# Patient Record
Sex: FEMALE | Race: OTHER | HISPANIC OR LATINO | Employment: FULL TIME | URBAN - METROPOLITAN AREA
[De-identification: names, ages, dates, MRNs, and addresses within clinical notes are randomized per-mention and may not be internally consistent; named-entity substitution may affect disease eponyms.]

---

## 2021-08-04 ENCOUNTER — OFFICE VISIT (OUTPATIENT)
Dept: GASTROENTEROLOGY | Facility: AMBULARY SURGERY CENTER | Age: 56
End: 2021-08-04
Payer: COMMERCIAL

## 2021-08-04 ENCOUNTER — APPOINTMENT (OUTPATIENT)
Dept: LAB | Facility: HOSPITAL | Age: 56
End: 2021-08-04
Attending: INTERNAL MEDICINE
Payer: COMMERCIAL

## 2021-08-04 ENCOUNTER — HOSPITAL ENCOUNTER (OUTPATIENT)
Dept: CT IMAGING | Facility: HOSPITAL | Age: 56
Discharge: HOME | End: 2021-08-04
Attending: INTERNAL MEDICINE
Payer: COMMERCIAL

## 2021-08-04 VITALS
WEIGHT: 181 LBS | DIASTOLIC BLOOD PRESSURE: 80 MMHG | HEIGHT: 63 IN | BODY MASS INDEX: 32.07 KG/M2 | SYSTOLIC BLOOD PRESSURE: 132 MMHG

## 2021-08-04 DIAGNOSIS — R10.12 LEFT UPPER QUADRANT ABDOMINAL PAIN: ICD-10-CM

## 2021-08-04 DIAGNOSIS — K59.09 OTHER CONSTIPATION: ICD-10-CM

## 2021-08-04 DIAGNOSIS — R10.12 LEFT UPPER QUADRANT ABDOMINAL PAIN: Primary | ICD-10-CM

## 2021-08-04 LAB
ANION GAP SERPL CALCULATED.3IONS-SCNC: 11 MMOL/L (ref 5–14)
BUN SERPL-MCNC: 9 MG/DL (ref 5–25)
CALCIUM SERPL-MCNC: 10.1 MG/DL (ref 8.4–10.2)
CHLORIDE SERPL-SCNC: 101 MMOL/L (ref 97–108)
CO2 SERPL-SCNC: 28 MMOL/L (ref 22–30)
CREAT SERPL-MCNC: 0.64 MG/DL (ref 0.6–1.2)
GFR SERPL CREATININE-BSD FRML MDRD: 101 ML/MIN/1.73SQ M
GLUCOSE P FAST SERPL-MCNC: 165 MG/DL (ref 70–99)
POTASSIUM SERPL-SCNC: 4.3 MMOL/L (ref 3.6–5)
SODIUM SERPL-SCNC: 140 MMOL/L (ref 137–147)

## 2021-08-04 PROCEDURE — 99204 OFFICE O/P NEW MOD 45 MIN: CPT | Performed by: INTERNAL MEDICINE

## 2021-08-04 PROCEDURE — 80048 BASIC METABOLIC PNL TOTAL CA: CPT

## 2021-08-04 PROCEDURE — G1004 CDSM NDSC: HCPCS

## 2021-08-04 PROCEDURE — 36415 COLL VENOUS BLD VENIPUNCTURE: CPT

## 2021-08-04 PROCEDURE — 74177 CT ABD & PELVIS W/CONTRAST: CPT

## 2021-08-04 RX ORDER — PANTOPRAZOLE SODIUM 40 MG/1
40 TABLET, DELAYED RELEASE ORAL DAILY
Qty: 30 TABLET | Refills: 2 | Status: SHIPPED | OUTPATIENT
Start: 2021-08-04 | End: 2022-03-09 | Stop reason: ALTCHOICE

## 2021-08-04 RX ADMIN — IOHEXOL 100 ML: 350 INJECTION, SOLUTION INTRAVENOUS at 19:42

## 2021-08-04 NOTE — ASSESSMENT & PLAN NOTE
Patient with acute onset of abdominal pain since last week and was noted to have some tenderness on the left lower ribcage also during physical exam   Rule out musculoskeletal pain  Rule out peptic ulcer disease or gastric erosions  Rule out any internal hernia  Also possible symptoms are due to constipation  Doubt for diverticulitis  -  Explained to patient in detail about different possible etiologies and given reassurance  -  Schedule for status CT scan of the abdomen and pelvis    - put her on Protonix regularly  Discussed about Carafate but patient reports having constipation before  - Patient was explained about the lifestyle and dietary modifications  Advised to avoid fatty foods, chocolates, caffeine, alcohol and any other triggering foods  Avoid eating for at least 3 hours before going to bed

## 2021-08-04 NOTE — PROGRESS NOTES
Consultation - [facility]  Rosie Harry 1965 female         Chief Complaint:   Abdominal pain    HPI:   60-year-old female with no significant past medical history reports having left upper quadrant pain about 5 days  Complaining about epigastric pain with radiation to the left upper quadrant pain which has been shown and pretty much constant  She had vomiting in the 1st a but having symptoms of nausea since then  Denies any more vomiting  Denies any NSAID use  She also reports having problems with constipation in the stools are normally like ribbon shaped or pellets  She tried using Dulcolax without any significant help  She was admitted to [facility] back in April with similar symptoms and had upper endoscopy at that time which showed erosive duodenitis  She to Protonix for about a month  Good appetite, no recent weight loss  She had colonoscopy about 10 years ago  She gives history of chronic constipation for which she was on Linzess in the past but could not tolerate because of diarrhea  REVIEW OF SYSTEMS: Review of Systems   Constitutional: Negative for activity change, appetite change, chills, diaphoresis, fatigue, fever and unexpected weight change  HENT: Negative for ear discharge, ear pain, facial swelling, hearing loss, nosebleeds, sore throat, tinnitus and voice change  Eyes: Negative for pain, discharge, redness, itching and visual disturbance  Respiratory: Negative for apnea, cough, chest tightness, shortness of breath and wheezing  Cardiovascular: Negative for chest pain and palpitations  Gastrointestinal:        As noted in HPI   Endocrine: Negative for cold intolerance, heat intolerance and polyuria  Genitourinary: Negative for difficulty urinating, dysuria, flank pain, hematuria and urgency  Musculoskeletal: Negative for arthralgias, back pain, gait problem, joint swelling and myalgias  Skin: Negative for rash and wound  Neurological: Negative for dizziness, tremors, seizures, speech difficulty, light-headedness, numbness and headaches  Hematological: Negative for adenopathy  Does not bruise/bleed easily  Psychiatric/Behavioral: Negative for agitation, behavioral problems and confusion  The patient is not nervous/anxious  Past Medical History:   Diagnosis Date    Peptic ulcer     RA (rheumatoid arthritis) ([facility] [address] )       Past Surgical History:   Procedure Laterality Date    APPENDECTOMY      COLONOSCOPY      MOLE EXCISION      Back of head     TUBAL LIGATION      UPPER GASTROINTESTINAL ENDOSCOPY       Social History     Socioeconomic History    Marital status:      Spouse name: Not on file    Number of children: Not on file    Years of education: Not on file    Highest education level: Not on file   Occupational History    Not on file   Tobacco Use    Smoking status: Never Smoker    Smokeless tobacco: Never Used   Substance and Sexual Activity    Alcohol use: Never    Drug use: Never    Sexual activity: Not on file   Other Topics Concern    Not on file   Social History Narrative    Not on file     Social Determinants of Health     Financial Resource Strain:     Difficulty of Paying Living Expenses:    Food Insecurity:     Worried About Running Out of Food in the Last Year:     Ran Out of Food in the Last Year:    Transportation Needs:     Lack of Transportation (Medical):      Lack of Transportation (Non-Medical):    Physical Activity:     Days of Exercise per Week:     Minutes of Exercise per Session:    Stress:     Feeling of Stress :    Social Connections:     Frequency of Communication with Friends and Family:     Frequency of Social Gatherings with Friends and Family:     Attends Gnosticism Services:     Active Member of Clubs or Organizations:     Attends Club or Organization Meetings:     Marital Status:    Intimate Partner Violence:     Fear of Current or Ex-Partner:     Emotionally Abused:     Physically Abused:     Sexually Abused:      Family History   Problem Relation Age of Onset    Breast cancer Mother     Pancreatic cancer Mother     Cervical cancer Sister     Ovarian cancer Maternal Grandmother     Breast cancer Maternal Aunt     Throat cancer Maternal Aunt     Stomach cancer Maternal Aunt      Aspirin, Fluconazole, Other, Penicillins, Sulfa antibiotics, Sulfamethoxazole-trimethoprim, Tomato - food allergy, Tuberculin ppd, Latex, and Medical tape  Current Outpatient Medications   Medication Sig Dispense Refill    pantoprazole (PROTONIX) 40 mg tablet Take 1 tablet (40 mg total) by mouth daily 30 tablet 2     No current facility-administered medications for this visit  Blood pressure 132/80, height 5' 3" (1 6 m), weight 82 1 kg (181 lb)  PHYSICAL EXAM: Physical Exam  Constitutional:       Appearance: She is well-developed  HENT:      Head: Normocephalic and atraumatic  Nose: Nose normal    Eyes:      General: No scleral icterus  Right eye: No discharge  Left eye: No discharge  Conjunctiva/sclera: Conjunctivae normal    Neck:      Thyroid: No thyromegaly  Vascular: No JVD  Trachea: No tracheal deviation  Cardiovascular:      Rate and Rhythm: Normal rate and regular rhythm  Heart sounds: Normal heart sounds  No murmur heard  No friction rub  No gallop  Pulmonary:      Effort: Pulmonary effort is normal  No respiratory distress  Breath sounds: Normal breath sounds  No wheezing or rales  Chest:      Chest wall: No tenderness  Abdominal:      General: Bowel sounds are normal  There is no distension  Palpations: Abdomen is soft  There is no mass  Tenderness: There is abdominal tenderness in the left upper quadrant  There is no guarding or rebound  Hernia: No hernia is present  Musculoskeletal:         General: No tenderness or deformity  Cervical back: Neck supple     Lymphadenopathy: Cervical: No cervical adenopathy  Skin:     General: Skin is warm and dry  Findings: No erythema or rash  Neurological:      Mental Status: She is alert and oriented to person, place, and time  Psychiatric:         Behavior: Behavior normal          Thought Content: Thought content normal           No results found for: WBC, HGB, HCT, MCV, PLT  No results found for: GLUCOSE, CALCIUM, NA, K, CO2, CL, BUN, CREATININE  No results found for: ALT, AST, GGT, ALKPHOS, BILITOT  No results found for: INR, PROTIME    Patient was never admitted  ASSESSMENT & PLAN:    Left upper quadrant abdominal pain  Patient with acute onset of abdominal pain since last week and was noted to have some tenderness on the left lower ribcage also during physical exam   Rule out musculoskeletal pain  Rule out peptic ulcer disease or gastric erosions  Rule out any internal hernia  Also possible symptoms are due to constipation  Doubt for diverticulitis  -  Explained to patient in detail about different possible etiologies and given reassurance  -  Schedule for status CT scan of the abdomen and pelvis    - put her on Protonix regularly  Discussed about Carafate but patient reports having constipation before  - Patient was explained about the lifestyle and dietary modifications  Advised to avoid fatty foods, chocolates, caffeine, alcohol and any other triggering foods  Avoid eating for at least 3 hours before going to bed  Other constipation    Appear to be physiologic but should rule out colorectal lesions     -  Advised patient to take stool softeners and MiraLax but she reports that she has difficult time with MiraLax  Advised her to mix with water and drink it       -Schedule for colonoscopy  -High-fiber diet     -Patient was given instructions about the colonoscopy prep     -Patient was explained about  the risks and benefits of the procedure   Risks including but not limited to bleeding, infection, perforation were explained in detail  Also explained about less than 100% sensitivity with the exam and other alternatives

## 2021-08-04 NOTE — H&P (VIEW-ONLY)
Consultation - [facility]  Erminio Leventhal 1965 female         Chief Complaint:   Abdominal pain    HPI:   59-year-old female with no significant past medical history reports having left upper quadrant pain about 5 days  Complaining about epigastric pain with radiation to the left upper quadrant pain which has been shown and pretty much constant  She had vomiting in the 1st a but having symptoms of nausea since then  Denies any more vomiting  Denies any NSAID use  She also reports having problems with constipation in the stools are normally like ribbon shaped or pellets  She tried using Dulcolax without any significant help  She was admitted to [facility] back in April with similar symptoms and had upper endoscopy at that time which showed erosive duodenitis  She to Protonix for about a month  Good appetite, no recent weight loss  She had colonoscopy about 10 years ago  She gives history of chronic constipation for which she was on Linzess in the past but could not tolerate because of diarrhea  REVIEW OF SYSTEMS: Review of Systems   Constitutional: Negative for activity change, appetite change, chills, diaphoresis, fatigue, fever and unexpected weight change  HENT: Negative for ear discharge, ear pain, facial swelling, hearing loss, nosebleeds, sore throat, tinnitus and voice change  Eyes: Negative for pain, discharge, redness, itching and visual disturbance  Respiratory: Negative for apnea, cough, chest tightness, shortness of breath and wheezing  Cardiovascular: Negative for chest pain and palpitations  Gastrointestinal:        As noted in HPI   Endocrine: Negative for cold intolerance, heat intolerance and polyuria  Genitourinary: Negative for difficulty urinating, dysuria, flank pain, hematuria and urgency  Musculoskeletal: Negative for arthralgias, back pain, gait problem, joint swelling and myalgias  Skin: Negative for rash and wound  Neurological: Negative for dizziness, tremors, seizures, speech difficulty, light-headedness, numbness and headaches  Hematological: Negative for adenopathy  Does not bruise/bleed easily  Psychiatric/Behavioral: Negative for agitation, behavioral problems and confusion  The patient is not nervous/anxious  Past Medical History:   Diagnosis Date    Peptic ulcer     RA (rheumatoid arthritis) ([facility] [address] )       Past Surgical History:   Procedure Laterality Date    APPENDECTOMY      COLONOSCOPY      MOLE EXCISION      Back of head     TUBAL LIGATION      UPPER GASTROINTESTINAL ENDOSCOPY       Social History     Socioeconomic History    Marital status:      Spouse name: Not on file    Number of children: Not on file    Years of education: Not on file    Highest education level: Not on file   Occupational History    Not on file   Tobacco Use    Smoking status: Never Smoker    Smokeless tobacco: Never Used   Substance and Sexual Activity    Alcohol use: Never    Drug use: Never    Sexual activity: Not on file   Other Topics Concern    Not on file   Social History Narrative    Not on file     Social Determinants of Health     Financial Resource Strain:     Difficulty of Paying Living Expenses:    Food Insecurity:     Worried About Running Out of Food in the Last Year:     Ran Out of Food in the Last Year:    Transportation Needs:     Lack of Transportation (Medical):      Lack of Transportation (Non-Medical):    Physical Activity:     Days of Exercise per Week:     Minutes of Exercise per Session:    Stress:     Feeling of Stress :    Social Connections:     Frequency of Communication with Friends and Family:     Frequency of Social Gatherings with Friends and Family:     Attends Christian Services:     Active Member of Clubs or Organizations:     Attends Club or Organization Meetings:     Marital Status:    Intimate Partner Violence:     Fear of Current or Ex-Partner:     Emotionally Abused:     Physically Abused:     Sexually Abused:      Family History   Problem Relation Age of Onset    Breast cancer Mother     Pancreatic cancer Mother     Cervical cancer Sister     Ovarian cancer Maternal Grandmother     Breast cancer Maternal Aunt     Throat cancer Maternal Aunt     Stomach cancer Maternal Aunt      Aspirin, Fluconazole, Other, Penicillins, Sulfa antibiotics, Sulfamethoxazole-trimethoprim, Tomato - food allergy, Tuberculin ppd, Latex, and Medical tape  Current Outpatient Medications   Medication Sig Dispense Refill    pantoprazole (PROTONIX) 40 mg tablet Take 1 tablet (40 mg total) by mouth daily 30 tablet 2     No current facility-administered medications for this visit  Blood pressure 132/80, height 5' 3" (1 6 m), weight 82 1 kg (181 lb)  PHYSICAL EXAM: Physical Exam  Constitutional:       Appearance: She is well-developed  HENT:      Head: Normocephalic and atraumatic  Nose: Nose normal    Eyes:      General: No scleral icterus  Right eye: No discharge  Left eye: No discharge  Conjunctiva/sclera: Conjunctivae normal    Neck:      Thyroid: No thyromegaly  Vascular: No JVD  Trachea: No tracheal deviation  Cardiovascular:      Rate and Rhythm: Normal rate and regular rhythm  Heart sounds: Normal heart sounds  No murmur heard  No friction rub  No gallop  Pulmonary:      Effort: Pulmonary effort is normal  No respiratory distress  Breath sounds: Normal breath sounds  No wheezing or rales  Chest:      Chest wall: No tenderness  Abdominal:      General: Bowel sounds are normal  There is no distension  Palpations: Abdomen is soft  There is no mass  Tenderness: There is abdominal tenderness in the left upper quadrant  There is no guarding or rebound  Hernia: No hernia is present  Musculoskeletal:         General: No tenderness or deformity  Cervical back: Neck supple     Lymphadenopathy: Cervical: No cervical adenopathy  Skin:     General: Skin is warm and dry  Findings: No erythema or rash  Neurological:      Mental Status: She is alert and oriented to person, place, and time  Psychiatric:         Behavior: Behavior normal          Thought Content: Thought content normal           No results found for: WBC, HGB, HCT, MCV, PLT  No results found for: GLUCOSE, CALCIUM, NA, K, CO2, CL, BUN, CREATININE  No results found for: ALT, AST, GGT, ALKPHOS, BILITOT  No results found for: INR, PROTIME    Patient was never admitted  ASSESSMENT & PLAN:    Left upper quadrant abdominal pain  Patient with acute onset of abdominal pain since last week and was noted to have some tenderness on the left lower ribcage also during physical exam   Rule out musculoskeletal pain  Rule out peptic ulcer disease or gastric erosions  Rule out any internal hernia  Also possible symptoms are due to constipation  Doubt for diverticulitis  -  Explained to patient in detail about different possible etiologies and given reassurance  -  Schedule for status CT scan of the abdomen and pelvis    - put her on Protonix regularly  Discussed about Carafate but patient reports having constipation before  - Patient was explained about the lifestyle and dietary modifications  Advised to avoid fatty foods, chocolates, caffeine, alcohol and any other triggering foods  Avoid eating for at least 3 hours before going to bed  Other constipation    Appear to be physiologic but should rule out colorectal lesions     -  Advised patient to take stool softeners and MiraLax but she reports that she has difficult time with MiraLax  Advised her to mix with water and drink it       -Schedule for colonoscopy  -High-fiber diet     -Patient was given instructions about the colonoscopy prep     -Patient was explained about  the risks and benefits of the procedure   Risks including but not limited to bleeding, infection, perforation were explained in detail  Also explained about less than 100% sensitivity with the exam and other alternatives

## 2021-08-04 NOTE — ASSESSMENT & PLAN NOTE
Appear to be physiologic but should rule out colorectal lesions     -  Advised patient to take stool softeners and MiraLax but she reports that she has difficult time with MiraLax  Advised her to mix with water and drink it       -Schedule for colonoscopy  -High-fiber diet     -Patient was given instructions about the colonoscopy prep     -Patient was explained about  the risks and benefits of the procedure  Risks including but not limited to bleeding, infection, perforation were explained in detail  Also explained about less than 100% sensitivity with the exam and other alternatives

## 2021-08-05 ENCOUNTER — TELEPHONE (OUTPATIENT)
Dept: GASTROENTEROLOGY | Facility: AMBULARY SURGERY CENTER | Age: 56
End: 2021-08-05

## 2021-08-11 ENCOUNTER — TELEPHONE (OUTPATIENT)
Dept: GASTROENTEROLOGY | Facility: AMBULARY SURGERY CENTER | Age: 56
End: 2021-08-11

## 2021-08-12 ENCOUNTER — HOSPITAL ENCOUNTER (OUTPATIENT)
Dept: GASTROENTEROLOGY | Facility: AMBULARY SURGERY CENTER | Age: 56
Setting detail: OUTPATIENT SURGERY
Discharge: HOME | End: 2021-08-12
Attending: INTERNAL MEDICINE | Admitting: INTERNAL MEDICINE
Payer: COMMERCIAL

## 2021-08-12 ENCOUNTER — ANESTHESIA EVENT (OUTPATIENT)
Dept: GASTROENTEROLOGY | Facility: AMBULARY SURGERY CENTER | Age: 56
End: 2021-08-12

## 2021-08-12 ENCOUNTER — ANESTHESIA (OUTPATIENT)
Dept: GASTROENTEROLOGY | Facility: AMBULARY SURGERY CENTER | Age: 56
End: 2021-08-12

## 2021-08-12 VITALS
DIASTOLIC BLOOD PRESSURE: 56 MMHG | HEIGHT: 64 IN | HEART RATE: 80 BPM | RESPIRATION RATE: 18 BRPM | OXYGEN SATURATION: 98 % | TEMPERATURE: 96.8 F | BODY MASS INDEX: 30.56 KG/M2 | WEIGHT: 179 LBS | SYSTOLIC BLOOD PRESSURE: 113 MMHG

## 2021-08-12 DIAGNOSIS — K59.09 OTHER CONSTIPATION: ICD-10-CM

## 2021-08-12 DIAGNOSIS — R10.12 LEFT UPPER QUADRANT ABDOMINAL PAIN: ICD-10-CM

## 2021-08-12 PROCEDURE — 88305 TISSUE EXAM BY PATHOLOGIST: CPT | Performed by: PATHOLOGY

## 2021-08-12 PROCEDURE — 45378 DIAGNOSTIC COLONOSCOPY: CPT | Performed by: INTERNAL MEDICINE

## 2021-08-12 PROCEDURE — 88342 IMHCHEM/IMCYTCHM 1ST ANTB: CPT | Performed by: PATHOLOGY

## 2021-08-12 PROCEDURE — 43239 EGD BIOPSY SINGLE/MULTIPLE: CPT | Performed by: INTERNAL MEDICINE

## 2021-08-12 RX ORDER — LIDOCAINE HYDROCHLORIDE 10 MG/ML
0.5 INJECTION, SOLUTION EPIDURAL; INFILTRATION; INTRACAUDAL; PERINEURAL ONCE AS NEEDED
Status: DISCONTINUED | OUTPATIENT
Start: 2021-08-12 | End: 2021-08-16 | Stop reason: HOSPADM

## 2021-08-12 RX ORDER — PROPOFOL 10 MG/ML
INJECTION, EMULSION INTRAVENOUS AS NEEDED
Status: DISCONTINUED | OUTPATIENT
Start: 2021-08-12 | End: 2021-08-12

## 2021-08-12 RX ORDER — SODIUM CHLORIDE, SODIUM LACTATE, POTASSIUM CHLORIDE, CALCIUM CHLORIDE 600; 310; 30; 20 MG/100ML; MG/100ML; MG/100ML; MG/100ML
125 INJECTION, SOLUTION INTRAVENOUS CONTINUOUS
Status: DISCONTINUED | OUTPATIENT
Start: 2021-08-12 | End: 2021-08-16 | Stop reason: HOSPADM

## 2021-08-12 RX ORDER — SODIUM CHLORIDE, SODIUM LACTATE, POTASSIUM CHLORIDE, CALCIUM CHLORIDE 600; 310; 30; 20 MG/100ML; MG/100ML; MG/100ML; MG/100ML
INJECTION, SOLUTION INTRAVENOUS CONTINUOUS PRN
Status: DISCONTINUED | OUTPATIENT
Start: 2021-08-12 | End: 2021-08-12

## 2021-08-12 RX ORDER — LIDOCAINE HYDROCHLORIDE 20 MG/ML
INJECTION, SOLUTION EPIDURAL; INFILTRATION; INTRACAUDAL; PERINEURAL AS NEEDED
Status: DISCONTINUED | OUTPATIENT
Start: 2021-08-12 | End: 2021-08-12

## 2021-08-12 RX ADMIN — LIDOCAINE HYDROCHLORIDE 80 MG: 20 INJECTION, SOLUTION EPIDURAL; INFILTRATION; INTRACAUDAL at 08:19

## 2021-08-12 RX ADMIN — PROPOFOL 50 MG: 10 INJECTION, EMULSION INTRAVENOUS at 08:20

## 2021-08-12 RX ADMIN — PROPOFOL 50 MG: 10 INJECTION, EMULSION INTRAVENOUS at 08:26

## 2021-08-12 RX ADMIN — PROPOFOL 100 MG: 10 INJECTION, EMULSION INTRAVENOUS at 08:19

## 2021-08-12 RX ADMIN — PROPOFOL 50 MG: 10 INJECTION, EMULSION INTRAVENOUS at 08:24

## 2021-08-12 RX ADMIN — PROPOFOL 50 MG: 10 INJECTION, EMULSION INTRAVENOUS at 08:32

## 2021-08-12 RX ADMIN — PROPOFOL 50 MG: 10 INJECTION, EMULSION INTRAVENOUS at 08:22

## 2021-08-12 RX ADMIN — SODIUM CHLORIDE, SODIUM LACTATE, POTASSIUM CHLORIDE, AND CALCIUM CHLORIDE: .6; .31; .03; .02 INJECTION, SOLUTION INTRAVENOUS at 08:11

## 2021-08-12 RX ADMIN — PROPOFOL 20 MG: 10 INJECTION, EMULSION INTRAVENOUS at 08:35

## 2021-08-12 RX ADMIN — Medication 40 MG: at 08:32

## 2021-08-12 RX ADMIN — PROPOFOL 50 MG: 10 INJECTION, EMULSION INTRAVENOUS at 08:29

## 2021-08-16 DIAGNOSIS — K59.09 OTHER CONSTIPATION: Primary | ICD-10-CM

## 2021-09-16 ENCOUNTER — TELEPHONE (OUTPATIENT)
Dept: GASTROENTEROLOGY | Facility: CLINIC | Age: 56
End: 2021-09-16

## 2021-09-16 DIAGNOSIS — K59.09 OTHER CONSTIPATION: Primary | ICD-10-CM

## 2021-09-16 NOTE — TELEPHONE ENCOUNTER
Patients GI provider:  Dr Sanchez Blazing    Number to return call: [phone]    Reason for call: Pt called very upset  Stated that she has been trying for a month now to get the prior auth for the lubiprostone       Scheduled procedure/appointment date if applicable: Apt/procedure NA

## 2021-09-16 NOTE — TELEPHONE ENCOUNTER
Called pt and made her aware I will start this today  I called pts pharmacy , they have the wrong fax number on file which is why we have not received any faxes from them to obtain auth  I submitted auth through cover my meds  Key [id number]  Sent to [employer]  Will follow up and keep pt informed of status

## 2021-09-17 ENCOUNTER — TELEPHONE (OUTPATIENT)
Dept: GASTROENTEROLOGY | Facility: AMBULARY SURGERY CENTER | Age: 56
End: 2021-09-17

## 2021-09-17 DIAGNOSIS — K59.09 OTHER CONSTIPATION: Primary | ICD-10-CM

## 2021-09-17 RX ORDER — PLECANATIDE 3 MG/1
3 TABLET ORAL DAILY
Qty: 30 TABLET | Refills: 0 | Status: SHIPPED | OUTPATIENT
Start: 2021-09-17 | End: 2021-10-21

## 2021-09-17 NOTE — TELEPHONE ENCOUNTER
Submitted new auth request through cover my meds for Linzess [facility]  Key [id number]   PA case ID# [id number]

## 2021-09-17 NOTE — TELEPHONE ENCOUNTER
Received denial from ins for the [address]  They want pt to try Gerri Raw first  Can we please prescribe so I can work on Melissa Darío?   Thank you

## 2021-09-17 NOTE — TELEPHONE ENCOUNTER
I called  and spoke with pt  She stated she tried and failed Linzess and cannot take it  She has to many side effects from it and it was also ineffective  Pt needs the Amitiza  Can someone please write a medical necessity letter for me and then I can try to appeal the Amitiza denial   I would also need a new script for the Amitiza and we can cancel the Linzess   Please assist   Thank you !

## 2021-09-17 NOTE — TELEPHONE ENCOUNTER
Letter is being done now for you can try to get Amitiza approved  It will be in system shortly in the meanwhile I ordered her Trulance 3 mg daily  I am out of options on to what to order her the only other option available to lactulose if they refuse the Trulance  Please inform patient that letter of appeal is being done the approved by her insurance company but there is no guarantees and inform her that in the meanwhile Trulance was sent into her pharmacy for her constipation   Thank you

## 2021-09-17 NOTE — TELEPHONE ENCOUNTER
I called pt ins and canceled the auth request for the Vasu Gibbons  Resubmitted a request for the Amitiza  Per Helen Qiu at ins she canceled the auth and submitted new one for Amitiza  She is faxing me over forms to fill out  Once filled out need to refax form with office notes and letter of necessity

## 2021-09-17 NOTE — TELEPHONE ENCOUNTER
Pt called again stating something else needs to be put in other than the linzess being that she has too many side affects

## 2021-09-18 NOTE — TELEPHONE ENCOUNTER
I received prior auth approval for Trulance through cover my meds  PA Case ID# [id number]  Approved 9/18/2021-9/18/2022  Per Savage Kraft at pharmacy pts copay is $130 00 even with auth  I called pt and made her aware , she stated ok  I also let pt know I am still appealing to try to get the Amitiza approved and I would keep her updated  PT understood

## 2021-09-20 NOTE — TELEPHONE ENCOUNTER
Faxed over appeal letter and office notes today  Will follow up tomorrow for determination  Will make sure pt aware not to take both medications

## 2021-09-21 NOTE — TELEPHONE ENCOUNTER
Called and spoke with Migue Zimmerman  She stated appeal is in review with [facility]  They will mail us determination  I will call Thursday [facility]) and ask for status   Pt aware

## 2021-09-21 NOTE — TELEPHONE ENCOUNTER
I called [facility] and spoke with Anuj Camp  She stated the Amitiza appeal is in review with [facility]  They will send me letter upon determination  I will call [facility] again Thursday for a follow up for status if no word from them  I called pt and made her aware  PT understood

## 2021-09-23 ENCOUNTER — TELEPHONE (OUTPATIENT)
Dept: GASTROENTEROLOGY | Facility: AMBULARY SURGERY CENTER | Age: 56
End: 2021-09-23

## 2021-09-23 NOTE — TELEPHONE ENCOUNTER
I called [facility] to follow up on appeal status for Amitiza  Per Blas Peterson I needed to call [facility] now for appeal at [phone] bc ins takes over appeals   I called [facility] [phone] and spoke with Kindra Clement  She stated they do not handle the appeals that I have to speak with [employer] [phone]  I spoke with Jessica Mitchell at [employer] who is with the appeal unit [facility] and she stated I need to call orig number back and go through the benefits NOT auth  Called [employer] back at [phone] and followed ALL prompts for benefits  I spoke with Tracy Masterson and she was able to tell me they do not have all of the appeal information on file at this time  Per Tracy Masterson this can take 30 days  I explained I marked this urgent and the patient has already been waiting a month for this medication  Per Tracy Masterson I can refax all appeal info to [phone] or the  Claims dept fax at [phone]  I refaxed all info again and marked urgent  I will call in 3 days to make sure that appeal info was received, what is the status and who is assigned the case  Ref#[id number]  [id number]  I called pt and updated her on status as well

## 2021-09-27 NOTE — TELEPHONE ENCOUNTER
Received fax stating Chula Ang is approved by [facility]  Good 9/27/2021-9/27/2022  Id# [id number]  I called pharmacy and spoke with [employer]  She stated she sees its approved and will fill for patient  I LM on pt cell # letting her know approved  I stated to stop the Trulance and take Amitiza only  Advised to call with any questions or concerns

## 2021-10-21 DIAGNOSIS — K59.09 OTHER CONSTIPATION: ICD-10-CM

## 2021-10-21 RX ORDER — PLECANATIDE 3 MG/1
TABLET ORAL
Qty: 30 TABLET | Refills: 0 | Status: SHIPPED | OUTPATIENT
Start: 2021-10-21 | End: 2022-03-09 | Stop reason: ALTCHOICE

## 2021-11-04 ENCOUNTER — APPOINTMENT (OUTPATIENT)
Dept: LAB | Facility: CLINIC | Age: 56
End: 2021-11-04

## 2021-11-04 ENCOUNTER — OCCMED (OUTPATIENT)
Dept: URGENT CARE | Facility: CLINIC | Age: 56
End: 2021-11-04

## 2021-11-04 DIAGNOSIS — Z02.1 PRE-EMPLOYMENT HEALTH SCREENING EXAMINATION: ICD-10-CM

## 2021-11-04 DIAGNOSIS — Z02.1 PHYSICAL EXAM, PRE-EMPLOYMENT: Primary | ICD-10-CM

## 2021-11-04 LAB
RUBV IGG SERPL IA-ACNC: >175 IU/ML
RUBV IGG SERPL IA-ACNC: >175 IU/ML

## 2021-11-04 PROCEDURE — 86762 RUBELLA ANTIBODY: CPT

## 2021-11-04 PROCEDURE — 86787 VARICELLA-ZOSTER ANTIBODY: CPT

## 2021-11-04 PROCEDURE — 86735 MUMPS ANTIBODY: CPT

## 2021-11-04 PROCEDURE — 86480 TB TEST CELL IMMUN MEASURE: CPT

## 2021-11-04 PROCEDURE — 36415 COLL VENOUS BLD VENIPUNCTURE: CPT

## 2021-11-04 PROCEDURE — 86765 RUBEOLA ANTIBODY: CPT

## 2021-11-08 LAB
GAMMA INTERFERON BACKGROUND BLD IA-ACNC: 0.04 IU/ML
GAMMA INTERFERON BACKGROUND BLD IA-ACNC: 0.04 IU/ML
M TB IFN-G BLD-IMP: NEGATIVE
M TB IFN-G BLD-IMP: NEGATIVE
M TB IFN-G CD4+ BCKGRND COR BLD-ACNC: 0.03 IU/ML
M TB IFN-G CD4+ BCKGRND COR BLD-ACNC: 0.03 IU/ML
M TB IFN-G CD4+ BCKGRND COR BLD-ACNC: 0.06 IU/ML
M TB IFN-G CD4+ BCKGRND COR BLD-ACNC: 0.06 IU/ML
MITOGEN IGNF BCKGRD COR BLD-ACNC: >10 IU/ML
MITOGEN IGNF BCKGRD COR BLD-ACNC: >10 IU/ML
VZV IGG SER IA-ACNC: NORMAL
VZV IGG SER IA-ACNC: NORMAL

## 2021-11-12 LAB
MEV IGG SER QL: NORMAL
MEV IGG SER QL: NORMAL

## 2021-11-13 LAB
MUV IGG SER QL: NORMAL
MUV IGG SER QL: NORMAL

## 2022-01-24 ENCOUNTER — APPOINTMENT (OUTPATIENT)
Dept: RADIOLOGY | Facility: CLINIC | Age: 57
End: 2022-01-24
Payer: COMMERCIAL

## 2022-01-24 ENCOUNTER — OFFICE VISIT (OUTPATIENT)
Dept: URGENT CARE | Facility: CLINIC | Age: 57
End: 2022-01-24
Payer: COMMERCIAL

## 2022-01-24 VITALS
OXYGEN SATURATION: 96 % | WEIGHT: 166 LBS | RESPIRATION RATE: 18 BRPM | TEMPERATURE: 99.5 F | SYSTOLIC BLOOD PRESSURE: 126 MMHG | BODY MASS INDEX: 27.66 KG/M2 | DIASTOLIC BLOOD PRESSURE: 66 MMHG | HEIGHT: 65 IN | HEART RATE: 94 BPM

## 2022-01-24 DIAGNOSIS — S63.501A WRIST SPRAIN, RIGHT, INITIAL ENCOUNTER: Primary | ICD-10-CM
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
## 2022-01-24 DIAGNOSIS — S69.91XA INJURY OF RIGHT WRIST, INITIAL ENCOUNTER: ICD-10-CM

## 2022-01-24 PROCEDURE — 99213 OFFICE O/P EST LOW 20 MIN: CPT | Performed by: PHYSICIAN ASSISTANT

## 2022-01-24 PROCEDURE — 73110 X-RAY EXAM OF WRIST: CPT

## 2022-01-24 NOTE — PATIENT INSTRUCTIONS
Wrist Sprain   WHAT YOU NEED TO KNOW:   A wrist sprain happens when one or more ligaments in your wrist stretch or tear  Ligaments are tough tissues that connect bones and keep them in place, and support your joints  DISCHARGE INSTRUCTIONS:   Return to the emergency department if:   · You have severe pain or swelling  · Your injured wrist is red or has red streaks spreading from the injured area  · You have new trouble moving your hands, fingers, or wrist     · Your wrist, hand, or fingers feel cold or numb  · Your fingernails turn blue or gray  Call your doctor if:   · Your symptoms get worse  · You have pain and swelling for more than 48 hours  · You have questions or concerns about your condition or care  Medicines: You may need any of the following:  · NSAIDs , such as ibuprofen, help decrease swelling, pain, and fever  NSAIDs can cause stomach bleeding or kidney problems in certain people  If you take blood thinner medicine, always ask your healthcare provider if NSAIDs are safe for you  Always read the medicine label and follow directions  · Acetaminophen  decreases pain and fever  It is available without a doctor's order  Ask how much to take and how often to take it  Follow directions  Read the labels of all other medicines you are using to see if they also contain acetaminophen, or ask your doctor or pharmacist  Acetaminophen can cause liver damage if not taken correctly  Do not use more than 4 grams (4,000 milligrams) total of acetaminophen in one day  · Take your medicine as directed  Contact your healthcare provider if you think your medicine is not helping or if you have side effects  Tell him or her if you are allergic to any medicine  Keep a list of the medicines, vitamins, and herbs you take  Include the amounts, and when and why you take them  Bring the list or the pill bottles to follow-up visits   Carry your medicine list with you in case of an emergency  Self-care:   · Rest  your wrist for at least 48 hours  Avoid activities that cause pain  · Ice  your wrist for 15 to 20 minutes every hour or as directed  Use an ice pack, or put crushed ice in a plastic bag  Cover it with a towel before you put it on your wrist  Ice helps prevent tissue damage and decreases swelling and pain  · Compress  your wrist with an elastic bandage  This will help decrease swelling, support your wrist, and help it heal  Wear your wrist wrap as directed  The elastic bandage should be snug but not tight  · Elevate  your wrist above the level of your heart as often as you can  This will help decrease swelling and pain  Prop your wrist on pillows or blankets to keep it elevated comfortably  Wrist support: You may need to wear a splint or cast to support your wrist and prevent more damage  Wear your splint as directed  Ask for instructions on how to bathe while you are wearing a splint or cast   Physical therapy:  Your healthcare provider may recommend that you go to physical therapy  A physical therapist teaches you exercises to help improve movement and strength, and to decrease pain  Follow up with your doctor as directed:  Write down your questions so you remember to ask them during your visits  © Copyright [employer] 2021 Information is for End User's use only and may not be sold, redistributed or otherwise used for commercial purposes  All illustrations and images included in CareNotes® are the copyrighted property of [employer]  or Madan Paul  The above information is an  only  It is not intended as medical advice for individual conditions or treatments  Talk to your doctor, nurse or pharmacist before following any medical regimen to see if it is safe and effective for you

## 2022-01-24 NOTE — LETTER
January 24, 2022     Patient: Torres Galindo   YOB: 1965   Date of Visit: 1/24/2022       To Whom it May Concern:    Torres Galindo was seen in my clinic on 1/24/2022  She has been diagnosed with a right wrist sprain  She may return to work today but may leave early if needed  If you have any questions or concerns, please don't hesitate to call           Sincerely,          Tasia Panda, PAPHIL        CC: No Recipients

## 2022-01-24 NOTE — PROGRESS NOTES
[id number]        NAME: Ana Tay is a 64 y o  female  : 1965    MRN: [id number]  DATE:  2022  TIME: 10:37 AM    Assessment and Plan   Wrist sprain, right, initial encounter [S63 501A]  1  Wrist sprain, right, initial encounter  XR wrist 3+ vw right   2  Injury caused by straining with sudden strenuous movement, initial encounter           Patient Instructions     Patient has sustained sprain of right wrist   X-rays negative for fracture dislocation  She was given an Ace bandage for compression  Recommended ice, rest, gentle intermittent stretching/range of motion exercise as tolerated, Tylenol  Should be re-evaluated if condition does not significantly improve over the next 1-2 weeks  Follow up with PCP in 3-5 days  Proceed to  ER if symptoms worsen  Chief Complaint     Chief Complaint   Patient presents with    Wrist Pain     Right wrist pain s/p having hand slip off snow shovel on Saturday  Has mild localized bruising fainting  Used ice and Tylenol  History of Present Illness       Patient presents after sustaining injury to her right wrist which occurred 2 days ago  She reports that she was using a snow shovel to chop up ice on her stairs and her hand slipped and she hit it directly against the concrete step  She reports pain and bruising over the dorsal aspect  Has applied ice and taking Tylenol  Denies numbness or paresthesias or any open wounds  Review of Systems   Review of Systems   Constitutional: Negative  Respiratory: Negative  Cardiovascular: Negative  Gastrointestinal: Negative  Genitourinary: Negative  Musculoskeletal:        Right wrist pain, bruising, and swelling status post injury   Skin: Negative  Neurological: Negative            Current Medications       Current Outpatient Medications:     Blood Glucose Monitoring Suppl (True Focus Blood Glucose Meter) TYRELL, Use as instructed, Disp: , Rfl:     metFORMIN (GLUCOPHAGE) 500 mg tablet, Take 1 tablet by mouth 2 (two) times a day with meals, Disp: , Rfl:     lubiprostone (AMITIZA) 24 mcg capsule, Take 1 capsule (24 mcg total) by mouth 2 (two) times a day with meals (Patient not taking: Reported on 1/24/2022 ), Disp: 60 capsule, Rfl: 2    pantoprazole (PROTONIX) 40 mg tablet, Take 1 tablet (40 mg total) by mouth daily (Patient not taking: Reported on 1/24/2022 ), Disp: 30 tablet, Rfl: 2    Trulance 3 MG TABS, TAKE 1 TABLET BY MOUTH DAILY (Patient not taking: Reported on 1/24/2022), Disp: 30 tablet, Rfl: 0    Current Allergies     Allergies as of 01/24/2022 - Reviewed 01/24/2022   Allergen Reaction Noted    Aspirin GI Intolerance 11/05/2012    Fluconazole Hives 11/05/2012    Other Hives 11/28/2012    Penicillins Other (See Comments) 06/24/2010    Sulfa antibiotics Other (See Comments) 06/24/2010    Sulfamethoxazole-trimethoprim Hives 11/05/2012    Tomato - food allergy Other (See Comments) 06/24/2010    Tuberculin ppd Other (See Comments) 11/05/2012    Latex Rash 06/24/2010    Medical tape Rash 11/28/2012            The following portions of the patient's history were reviewed and updated as appropriate: allergies, current medications, past family history, past medical history, past social history, past surgical history and problem list      Past Medical History:   Diagnosis Date    GERD (gastroesophageal reflux disease)     Peptic ulcer     RA (rheumatoid arthritis) ([facility] [address] )        Past Surgical History:   Procedure Laterality Date    APPENDECTOMY      COLONOSCOPY      MOLE EXCISION      Back of head     TUBAL LIGATION      UPPER GASTROINTESTINAL ENDOSCOPY         Family History   Problem Relation Age of Onset    Breast cancer Mother     Pancreatic cancer Mother     Cervical cancer Sister     Ovarian cancer Maternal Grandmother     Breast cancer Maternal Aunt     Throat cancer Maternal Aunt     Stomach cancer Maternal Aunt          Medications have been verified  Objective   /66   Pulse 94   Temp 99 5 °F (37 5 °C)   Resp 18   Ht 5' 4 75" (1 645 m)   Wt 75 3 kg (166 lb)   SpO2 96%   BMI 27 84 kg/m²   No LMP recorded  Physical Exam     Physical Exam  Vitals reviewed  Constitutional:       General: She is not in acute distress  Appearance: She is well-developed  Musculoskeletal:      Comments: Tenderness to palpation across the dorsal aspect of the right wrist diffusely with mild soft tissue swelling  No ecchymosis or significant deformity noted  Range of motion is limited due to discomfort  Skin:     Comments: No open wounds   Neurological:      Mental Status: She is alert and oriented to person, place, and time  Sensory: No sensory deficit

## 2022-03-09 ENCOUNTER — OFFICE VISIT (OUTPATIENT)
Dept: URGENT CARE | Facility: CLINIC | Age: 57
End: 2022-03-09
Payer: COMMERCIAL

## 2022-03-09 VITALS
WEIGHT: 159 LBS | HEIGHT: 64 IN | TEMPERATURE: 98 F | BODY MASS INDEX: 27.14 KG/M2 | HEART RATE: 88 BPM | OXYGEN SATURATION: 98 % | RESPIRATION RATE: 18 BRPM

## 2022-03-09 DIAGNOSIS — J01.01 ACUTE RECURRENT MAXILLARY SINUSITIS: Primary | ICD-10-CM

## 2022-03-09 PROCEDURE — S9083 URGENT CARE CENTER GLOBAL: HCPCS | Performed by: PHYSICIAN ASSISTANT

## 2022-03-09 PROCEDURE — G0382 LEV 3 HOSP TYPE B ED VISIT: HCPCS | Performed by: PHYSICIAN ASSISTANT

## 2022-03-09 RX ORDER — PREDNISONE 10 MG/1
TABLET ORAL
Qty: 24 TABLET | Refills: 0 | Status: SHIPPED | OUTPATIENT
Start: 2022-03-09 | End: 2022-03-21

## 2022-03-09 RX ORDER — DOXYCYCLINE 100 MG/1
100 CAPSULE ORAL 2 TIMES DAILY
Qty: 20 CAPSULE | Refills: 0 | Status: SHIPPED | OUTPATIENT
Start: 2022-03-09 | End: 2022-03-19

## 2022-03-09 NOTE — LETTER
March 9, 2022     Patient: Da Christiansen   YOB: 1965   Date of Visit: 3/9/2022       To Whom it May Concern:    Da Christiansen was seen in my clinic on 3/9/2022  She may return to work on 3/11/2022  If you have any questions or concerns, please don't hesitate to call           Sincerely,          Cabrera Wellington PA-C        CC: No Recipients

## 2022-03-09 NOTE — PATIENT INSTRUCTIONS

## 2022-03-09 NOTE — PROGRESS NOTES
[facility]        NAME: Carlo Grossman is a 64 y o  female  : 1965    MRN: [id number]  DATE:  2022  TIME: 9:51 AM    Assessment and Plan   Acute recurrent maxillary sinusitis [J01 01]  1  Acute recurrent maxillary sinusitis  doxycycline monohydrate (MONODOX) 100 mg capsule    predniSONE 10 mg tablet         Patient Instructions     Discussed condition with pt  He/she has acute sinusitis which I will treat with an oral abx and prednisone and rec hydration, rest, discussed OTC cough/cold meds, and observation  Follow up with PCP in 3-5 days  Proceed to  ER if symptoms worsen  Chief Complaint     Chief Complaint   Patient presents with    Nasal Congestion     c/o PMHx of sinus infections "before the Spring " Sx are left facial pain, nasal swelling, lymph node swelling (states to left sub-mandibular)  Fully vx'd for Covid and influenza  History of Present Illness       Patient presents what she reports is concern for possible sinus infection  She tends to get them before the started sprain  She has nasal/sinus congestion, left-sided facial pain, nasal swelling as well as swelling over her left maxillary sinus externally, swollen gland in the left side of her neck  Denies any significant cough, fever, chills, N/V/D, or recent known direct COVID exposure  Has been managing symptoms conservatively since onset  Has allergies but no asthma  Does not smoke  Review of Systems   Review of Systems   Constitutional: Negative  HENT: Positive for congestion, facial swelling, sinus pressure and sinus pain  Respiratory: Negative  Cardiovascular: Negative  Gastrointestinal: Negative  Genitourinary: Negative  Neurological: Positive for headaches  Hematological: Positive for adenopathy           Current Medications       Current Outpatient Medications:     metFORMIN (GLUCOPHAGE) 500 mg tablet, Take 1 tablet by mouth 2 (two) times a day with meals, Disp: , Rfl:   doxycycline monohydrate (MONODOX) 100 mg capsule, Take 1 capsule (100 mg total) by mouth 2 (two) times a day for 10 days Take with food (non-dairy)  , Disp: 20 capsule, Rfl: 0    predniSONE 10 mg tablet, 3-5-7-4-3-2-1 taper with food  , Disp: 24 tablet, Rfl: 0    Current Allergies     Allergies as of 03/09/2022 - Reviewed 03/09/2022   Allergen Reaction Noted    Aspirin GI Intolerance 11/05/2012    Fluconazole Hives 11/05/2012    Other Hives 11/28/2012    Penicillins Other (See Comments) 06/24/2010    Sulfa antibiotics Other (See Comments) 06/24/2010    Sulfamethoxazole-trimethoprim Hives 11/05/2012    Tomato - food allergy Other (See Comments) 06/24/2010    Tuberculin ppd Other (See Comments) 11/05/2012    Latex Rash 06/24/2010    Medical tape Rash 11/28/2012            The following portions of the patient's history were reviewed and updated as appropriate: allergies, current medications, past family history, past medical history, past social history, past surgical history and problem list      Past Medical History:   Diagnosis Date    GERD (gastroesophageal reflux disease)     Peptic ulcer     RA [facility]rheumatoid arthritis) ([facility] [address] )        Past Surgical History:   Procedure Laterality Date    APPENDECTOMY      COLONOSCOPY      MOLE EXCISION      Back of head     TUBAL LIGATION      UPPER GASTROINTESTINAL ENDOSCOPY         Family History   Problem Relation Age of Onset    Breast cancer Mother     Pancreatic cancer Mother     Cervical cancer Sister     Ovarian cancer Maternal Grandmother     Breast cancer Maternal Aunt     Throat cancer Maternal Aunt     Stomach cancer Maternal Aunt          Medications have been verified  Objective   Pulse 88   Temp 98 °F (36 7 °C)   Resp 18   Ht 5' 4" (1 626 m)   Wt 72 1 kg (159 lb)   SpO2 98%   BMI 27 29 kg/m²   No LMP recorded  Physical Exam     Physical Exam  Vitals reviewed     Constitutional:       General: She is not in acute distress  Appearance: She is well-developed  HENT:      Right Ear: Hearing, tympanic membrane, ear canal and external ear normal       Left Ear: Hearing, tympanic membrane, ear canal and external ear normal       Nose: Mucosal edema (B/L boggy turbinates) and congestion present  Left Sinus: Maxillary sinus tenderness present  Comments: Visible swelling over the left maxillary sinus     Mouth/Throat:      Mouth: Mucous membranes are moist       Pharynx: Oropharynx is clear  Cardiovascular:      Rate and Rhythm: Normal rate and regular rhythm  Pulses: Normal pulses  Heart sounds: Normal heart sounds  No murmur heard  Pulmonary:      Effort: Pulmonary effort is normal  No respiratory distress  Breath sounds: Normal breath sounds  Musculoskeletal:      Cervical back: Neck supple  Lymphadenopathy:      Cervical: No cervical adenopathy  Neurological:      Mental Status: She is alert and oriented to person, place, and time

## 2022-03-21 ENCOUNTER — TELEPHONE (OUTPATIENT)
Dept: FAMILY MEDICINE CLINIC | Facility: CLINIC | Age: 57
End: 2022-03-21

## 2022-03-21 ENCOUNTER — OFFICE VISIT (OUTPATIENT)
Dept: FAMILY MEDICINE CLINIC | Facility: CLINIC | Age: 57
End: 2022-03-21
Payer: COMMERCIAL

## 2022-03-21 VITALS
SYSTOLIC BLOOD PRESSURE: 124 MMHG | DIASTOLIC BLOOD PRESSURE: 86 MMHG | WEIGHT: 159.4 LBS | BODY MASS INDEX: 27.21 KG/M2 | HEIGHT: 64 IN | HEART RATE: 90 BPM

## 2022-03-21 DIAGNOSIS — K21.9 GASTROESOPHAGEAL REFLUX DISEASE, UNSPECIFIED WHETHER ESOPHAGITIS PRESENT: ICD-10-CM

## 2022-03-21 DIAGNOSIS — D25.9 UTERINE LEIOMYOMA, UNSPECIFIED LOCATION: ICD-10-CM

## 2022-03-21 DIAGNOSIS — E11.9 TYPE 2 DIABETES MELLITUS WITHOUT COMPLICATION, WITHOUT LONG-TERM CURRENT USE OF INSULIN (HCC): Primary | ICD-10-CM

## 2022-03-21 DIAGNOSIS — E78.2 MIXED HYPERLIPIDEMIA: ICD-10-CM

## 2022-03-21 DIAGNOSIS — N80.9 ENDOMETRIOSIS: ICD-10-CM

## 2022-03-21 PROBLEM — L40.9 PSORIASIS: Status: ACTIVE | Noted: 2018-11-20

## 2022-03-21 PROBLEM — Z80.41 FAMILY HISTORY OF OVARIAN CANCER: Status: ACTIVE | Noted: 2018-09-12

## 2022-03-21 LAB — SL AMB POCT HEMOGLOBIN AIC: 6.1 (ref ?–6.5)

## 2022-03-21 PROCEDURE — 83036 HEMOGLOBIN GLYCOSYLATED A1C: CPT | Performed by: NURSE PRACTITIONER

## 2022-03-21 PROCEDURE — 92250 FUNDUS PHOTOGRAPHY W/I&R: CPT | Performed by: NURSE PRACTITIONER

## 2022-03-21 PROCEDURE — 99204 OFFICE O/P NEW MOD 45 MIN: CPT | Performed by: NURSE PRACTITIONER

## 2022-03-21 NOTE — TELEPHONE ENCOUNTER
Patient had a visit today with silvino  Patient needs a work note because she left 30 minutes early  Patient can access letter via [employer]  Please advise   Thank you

## 2022-03-21 NOTE — ASSESSMENT & PLAN NOTE
Lab Results   Component Value Date    HGBA1C 6 1 03/21/2022     Patient's diabetes is extremely well controlled on her current dosage of metformin  She has also done an excellent job with weight loss through exercise and diet  Patient's hemoglobin A1c can be checked every 6 months at this point

## 2022-03-21 NOTE — PATIENT INSTRUCTIONS
Diabetes and Exercise   WHAT YOU NEED TO KNOW:   Physical activity, such as exercise, can help keep your blood sugar level steady or improve insulin resistance  Activity can help decrease your risk for heart disease, and help you lose weight  Exercise can also help lower your A1c  Your diabetes care team will help you create an exercise plan  The plan will be based on the type of diabetes you have and your starting fitness level  DISCHARGE INSTRUCTIONS:   Call your local emergency number (911 in the [address] [facility]) if:   · You have chest pain or shortness of breath  Return to the emergency department if:   · You have a low blood sugar level and it does not improve with treatment  Symptoms are trouble thinking, a pounding heartbeat, and sweating  · Your blood sugar level is above 240 mg/dL and does not come down within 15 minutes of treatment  · You have blurred or double vision  · Your breath has a fruity, sweet smell, or your breathing is shallow  Call your doctor or diabetes care team if:   · You have ketones in your blood or urine  · You have a fever  · Your blood sugar levels are higher than your target goals  · You often have low blood sugar levels  · Your skin is red, dry, warm, or swollen  · You have a wound that does not heal     · You have trouble coping with diabetes, or you feel anxious or depressed  · You have questions or concerns about your condition or care  Tips to help you create and meet your exercise goals:   · Set a goal for 150 minutes (2 5 hours) of moderate to vigorous aerobic activity each week  Aerobic activity helps your heart stay strong  Aerobic activity includes walking, bicycling, dancing, swimming, and raking leaves  Spread aerobic activity over 3 to 5 days  Do not take more than 2 days off in a row  It is best to do at least 10 minutes at a time and 30 minutes each day  You can work up to these goals  Remember that any activity is better than no activity  Over time, you can make exercise more intense or last longer  You can also add more days of exercise as your fitness level improves  Your diabetes care team can help you make a step-by-step plan to achieve your goals  · Set a strength training goal of 2 to 3 times a week  Take at least 1 day off in between strength training sessions  Strength training helps you keep the muscles you have and build new muscles  Strength training includes lifting weights, climbing stairs, yoga, and jose m chi          · Older adults should include balance training 2 to 3 times each week  These include walking backwards, standing on one foot, and walking heel to toe in a straight line  Other healthy exercise tips:   · Stretch before and after you exercise to prevent injury  · Drink water or liquids that do not contain sugar before, during, and after exercise  Ask your dietitian or healthcare provider which liquids you should drink when you exercise  · Do not sit for longer than 30 minutes at a time during your day  If you cannot walk around, at least stand up  This will help you stay active and keep your blood circulating  Exercise and blood sugar levels:  Check your blood sugar level before and after exercise, if you use insulin  Healthcare providers may tell you to change the amount of insulin you take or food you eat  · If your blood sugar level is high, check your blood or urine for ketones before you exercise  Do not exercise if your blood sugar level is high and you have ketones  · If your blood sugar level is less than 100 mg/dL, have a carbohydrate snack before you exercise  Examples are 4 to 6 crackers, ½ banana, 8 ounces (1 cup) of milk, or 4 ounces (½ cup) of juice  Follow up with your doctor or diabetes care team as directed:  Write down your questions so you remember to ask them during your visits    © Copyright [employer] 2022 Information is for End User's use only and may not be sold, redistributed or otherwise used for commercial purposes  All illustrations and images included in CareNotes® are the copyrighted property of [employer]  or Madan Paul  The above information is an  only  It is not intended as medical advice for individual conditions or treatments  Talk to your doctor, nurse or pharmacist before following any medical regimen to see if it is safe and effective for you

## 2022-03-21 NOTE — PROGRESS NOTES
Assessment and Plan:    Problem List Items Addressed This Visit        Digestive    Gastroesophageal reflux disease     This is currently diet controlled  Endocrine    Type 2 diabetes mellitus without complication, without long-term current use of insulin ([facility] [address] ) - Primary       Lab Results   Component Value Date    HGBA1C 6 1 03/21/2022     Patient's diabetes is extremely well controlled on her current dosage of metformin  She has also done an excellent job with weight loss through exercise and diet  Patient's hemoglobin A1c can be checked every 6 months at this point  Relevant Medications    metFORMIN (GLUCOPHAGE) 500 mg tablet    glucose blood test strip    Other Relevant Orders    POCT hemoglobin A1c (Completed)    IRIS Diabetic eye exam    Comprehensive metabolic panel    UA w Reflex to Microscopic w Reflex to Culture -Lab Collect    Microalbumin / creatinine urine ratio       Genitourinary    Leiomyoma of uterus     Patient does have regular follow-up with OBGYN regarding this  Other    Endometriosis     No current issues regarding this  Patient does have regular follow-up with OBGYN  Mixed hyperlipidemia     Lipid panel ordered to assess the status of this  Relevant Orders    Lipid Panel with Direct LDL reflex                 Diagnoses and all orders for this visit:    Type 2 diabetes mellitus without complication, without long-term current use of insulin ([facility])  -     POCT hemoglobin A1c  -     IRIS Diabetic eye exam  -     Comprehensive metabolic panel; Future  -     UA w Reflex to Microscopic w Reflex to Culture -Lab Collect; Future  -     Microalbumin / creatinine urine ratio  -     metFORMIN (GLUCOPHAGE) 500 mg tablet; Take 1 tablet (500 mg total) by mouth 2 (two) times a day with meals  -     glucose blood test strip; Use as instructed    Mixed hyperlipidemia  -     Lipid Panel with Direct LDL reflex;  Future    Gastroesophageal reflux disease, unspecified whether esophagitis present    Uterine leiomyoma, unspecified location    Endometriosis              Subjective:      Patient ID: Tate Holley is a 64 y o  female  CC:    Chief Complaint   Patient presents with    Well Check     New pt to established care, Mammogram and Cervical cancer screening done october 2021       HPI:    Type II diabetes:  Patient had a hemoglobin A1c completed in November 2021 which was 10 7 indicating that she is diabetic  This was not listed on patient's chart but this hemoglobin A1c does allow for the diagnosis of type 2 diabetes to be made  Patient is currently managed on metformin 500 mg b i d  Patient also reports that she has been dieting and exercising more the gym  The patient's HbA1C was 6 1 in the office today  Patient does have a One Touch device and her average over the past 90 days for BS has been 117  GERD:  This is currently diet controlled  Patient denies any current issues regarding this  Endometriosis/leiomyoma of uterus:  Patient does have regular follow-up with [facility] for this  She denies any current issues regarding this  Hyperlipidemia:  Patient's most recent lipid panel was completed in November 2021 and did show elevated total cholesterol and LDL at that time  Patient is currently not taking cholesterol medication  The following portions of the patient's history were reviewed and updated as appropriate: allergies, current medications, past family history, past medical history, past social history, past surgical history and problem list       Review of Systems   Constitutional: Negative for chills and fever  HENT: Negative for ear pain and sore throat  Eyes: Negative for pain and visual disturbance  Respiratory: Negative for cough, chest tightness, shortness of breath and wheezing  Cardiovascular: Negative for chest pain, palpitations and leg swelling     Gastrointestinal: Negative for abdominal pain, constipation, diarrhea, nausea and vomiting  Endocrine: Negative for cold intolerance, heat intolerance, polydipsia, polyphagia and polyuria  Genitourinary: Negative for decreased urine volume, dysuria and hematuria  Musculoskeletal: Negative for arthralgias, back pain and myalgias  Skin: Negative for color change and rash  Allergic/Immunologic: Positive for environmental allergies  Neurological: Negative for dizziness, seizures, syncope, weakness, light-headedness, numbness and headaches  Hematological: Negative for adenopathy  Psychiatric/Behavioral: Negative for confusion  The patient is not nervous/anxious  All other systems reviewed and are negative  Data to review:       Objective:    Vitals:    03/21/22 1752   BP: 124/86   BP Location: Right arm   Patient Position: Sitting   Cuff Size: Standard   Pulse: 90   Weight: 72 3 kg (159 lb 6 4 oz)   Height: 5' 4" (1 626 m)        Physical Exam  Vitals and nursing note reviewed  Constitutional:       General: She is not in acute distress  Appearance: Normal appearance  She is well-developed  She is not ill-appearing  HENT:      Head: Normocephalic and atraumatic  Eyes:      Conjunctiva/sclera: Conjunctivae normal    Cardiovascular:      Rate and Rhythm: Normal rate and regular rhythm  Pulses: Normal pulses  no weak pulses          Carotid pulses are 2+ on the right side and 2+ on the left side  Radial pulses are 2+ on the right side and 2+ on the left side  Dorsalis pedis pulses are 2+ on the right side and 2+ on the left side  Posterior tibial pulses are 2+ on the right side and 2+ on the left side  Heart sounds: Normal heart sounds  No murmur heard  Pulmonary:      Effort: Pulmonary effort is normal  No respiratory distress  Breath sounds: Normal breath sounds  No wheezing or rhonchi  Abdominal:      General: Abdomen is flat  Bowel sounds are normal  There is no distension  Palpations: Abdomen is soft  Tenderness: There is no abdominal tenderness  There is no guarding  Musculoskeletal:         General: Normal range of motion  Cervical back: Normal range of motion and neck supple  Right lower leg: No edema  Left lower leg: No edema  Feet:      Right foot:      Skin integrity: No ulcer, skin breakdown, erythema, warmth, callus or dry skin  Left foot:      Skin integrity: No ulcer, skin breakdown, erythema, warmth, callus or dry skin  Skin:     General: Skin is warm and dry  Capillary Refill: Capillary refill takes less than 2 seconds  Neurological:      General: No focal deficit present  Mental Status: She is alert and oriented to person, place, and time  Psychiatric:         Mood and Affect: Mood normal          Behavior: Behavior normal          Thought Content: Thought content normal          Judgment: Judgment normal          BMI Counseling: Body mass index is 27 36 kg/m²  BMI Counseling: Body mass index is 27 36 kg/m²  The BMI is above normal  Nutrition recommendations include decreasing portion sizes, encouraging healthy choices of fruits and vegetables, moderation in carbohydrate intake and increasing intake of lean protein  Exercise recommendations include exercising 3-5 times per week and obtaining a gym membership  No pharmacotherapy was ordered  Rationale for BMI follow-up plan is due to patient being overweight or obese  Diabetic Foot Exam    Patient's shoes and socks removed  Right Foot/Ankle   Right Foot Inspection  Skin Exam: skin normal  Skin not intact, no dry skin, no warmth, no callus, no erythema, no maceration, no abnormal color, no pre-ulcer, no ulcer and no callus  Toe Exam: ROM and strength within normal limits  No swelling, no tenderness, erythema and  no right toe deformity    Sensory   Vibration: intact  Proprioception: intact  Monofilament testing: intact    Vascular  Capillary refills: < 3 seconds  The right DP pulse is 2+   The right PT pulse is 2+  Left Foot/Ankle  Left Foot Inspection  Skin Exam: skin normal  Skin not intact, no dry skin, no warmth, no erythema, no maceration, normal color, no pre-ulcer, no ulcer and no callus  Toe Exam: ROM and strength within normal limits  No swelling, no erythema and no left toe deformity  Sensory   Vibration: intact  Proprioception: intact  Monofilament testing: intact    Vascular  Capillary refills: < 3 seconds  The left DP pulse is 2+  The left PT pulse is 2+       Assign Risk Category  No deformity present  No loss of protective sensation  No weak pulses  Risk: 0

## 2022-03-22 ENCOUNTER — TELEPHONE (OUTPATIENT)
Dept: FAMILY MEDICINE CLINIC | Facility: CLINIC | Age: 57
End: 2022-03-22

## 2022-03-22 NOTE — TELEPHONE ENCOUNTER
The pharmacist called from [facility]  The script sent in yesterday for the glucometer needs specifics  Brand? And Instructions? It can not say use as directed  Can resend in new info

## 2022-03-26 LAB
LEFT EYE DIABETIC RETINOPATHY: NORMAL
LEFT EYE IMAGE QUALITY: NORMAL
LEFT EYE MACULAR EDEMA: NORMAL
LEFT EYE OTHER RETINOPATHY: NORMAL
RIGHT EYE DIABETIC RETINOPATHY: NORMAL
RIGHT EYE IMAGE QUALITY: NORMAL
RIGHT EYE MACULAR EDEMA: NORMAL
RIGHT EYE OTHER RETINOPATHY: NORMAL
SEVERITY (EYE EXAM): NORMAL

## 2022-05-03 ENCOUNTER — OFFICE VISIT (OUTPATIENT)
Dept: URGENT CARE | Facility: CLINIC | Age: 57
End: 2022-05-03
Payer: COMMERCIAL

## 2022-05-03 VITALS
RESPIRATION RATE: 20 BRPM | TEMPERATURE: 100.4 F | WEIGHT: 156.7 LBS | BODY MASS INDEX: 26.11 KG/M2 | HEART RATE: 106 BPM | HEIGHT: 65 IN | OXYGEN SATURATION: 98 %

## 2022-05-03 DIAGNOSIS — R68.89 FLU-LIKE SYMPTOMS: Primary | ICD-10-CM

## 2022-05-03 PROCEDURE — 99213 OFFICE O/P EST LOW 20 MIN: CPT | Performed by: PHYSICIAN ASSISTANT

## 2022-05-03 PROCEDURE — 87636 SARSCOV2 & INF A&B AMP PRB: CPT | Performed by: PHYSICIAN ASSISTANT

## 2022-05-03 RX ORDER — BENZONATATE 200 MG/1
200 CAPSULE ORAL 3 TIMES DAILY PRN
Qty: 20 CAPSULE | Refills: 0 | Status: SHIPPED | OUTPATIENT
Start: 2022-05-03 | End: 2022-05-06 | Stop reason: ALTCHOICE

## 2022-05-03 RX ORDER — OSELTAMIVIR PHOSPHATE 75 MG/1
75 CAPSULE ORAL EVERY 12 HOURS SCHEDULED
Qty: 10 CAPSULE | Refills: 0 | Status: SHIPPED | OUTPATIENT
Start: 2022-05-03 | End: 2022-05-06 | Stop reason: ALTCHOICE

## 2022-05-03 NOTE — PROGRESS NOTES
[facility]        NAME: Carmen Fisher is a 64 y o  female  : 1965    MRN: [id number]  DATE: May 3, 2022  TIME: 3:14 PM    Assessment and Plan   Flu-like symptoms [R68 89]  1  Flu-like symptoms  oseltamivir (TAMIFLU) 75 mg capsule    benzonatate (TESSALON) 200 MG capsule    Covid/Flu-Office Collect         Patient Instructions      Take Tamiflu as directed   Take Tessalon as directed   Discussed etiology is likely viral   Swabbed for flu & COVID-19, discussed self quarantine until contacted with results  Monitor for worsening symptoms  C/w OTC symptom relief including tylenol, fluids, rest  Recommend supplementing with vitamins D & C as well as a multivitamin   Follow up with PCP in 3-5 days  Proceed to  ER if symptoms worsen  Chief Complaint     Chief Complaint   Patient presents with    Fever     Began  evening into Monday night with TMax 102 1; taking Tylenol round the clock  Last dose Tylenol at 1230 today  Vx'd for Covid and Influenza         History of Present Illness       Patient presents with complaint of flu symptoms which began  night  She describes congestion, fever, chills, body aches, fatigue, cough, and chest congestion  She denies dyspnea, abdominal pain, sore throat, nausea, vomiting, and diarrhea  She reports a T-max of 102 1° F  She states that she is vaccinated against COVID-19 and influenza but reports recent exposure to both at work  She reports taking Tylenol and Mucinex  Patient states that she has had some in the past and this feels the same  Review of Systems   Review of Systems   Constitutional: Positive for chills, fatigue and fever  HENT: Positive for congestion and rhinorrhea  Negative for ear pain and sore throat  Eyes: Negative for pain and visual disturbance  Respiratory: Positive for cough and chest tightness  Negative for shortness of breath  Cardiovascular: Negative for chest pain and palpitations     Gastrointestinal: Negative for abdominal pain, diarrhea, nausea and vomiting  Genitourinary: Negative for dysuria and hematuria  Musculoskeletal: Positive for myalgias  Negative for arthralgias and back pain  Skin: Negative for color change and rash  Neurological: Negative for seizures and syncope  All other systems reviewed and are negative          Current Medications       Current Outpatient Medications:     glucose blood test strip, Use as instructed, Disp: 300 each, Rfl: 3    metFORMIN (GLUCOPHAGE) 500 mg tablet, Take 1 tablet (500 mg total) by mouth 2 (two) times a day with meals, Disp: 60 tablet, Rfl: 5    benzonatate (TESSALON) 200 MG capsule, Take 1 capsule (200 mg total) by mouth 3 (three) times a day as needed for cough, Disp: 20 capsule, Rfl: 0    oseltamivir (TAMIFLU) 75 mg capsule, Take 1 capsule (75 mg total) by mouth every 12 (twelve) hours for 5 days, Disp: 10 capsule, Rfl: 0    Current Allergies     Allergies as of 05/03/2022 - Reviewed 05/03/2022   Allergen Reaction Noted    Aspirin GI Intolerance 11/05/2012    Fluconazole Hives 11/05/2012    Other Hives 11/28/2012    Penicillins Other (See Comments) 06/24/2010    Sulfa antibiotics Other (See Comments) 06/24/2010    Sulfamethoxazole-trimethoprim Hives 11/05/2012    Tomato - food allergy Other (See Comments) 06/24/2010    Tuberculin ppd Other (See Comments) 11/05/2012    Latex Rash 06/24/2010    Medical tape Rash 11/28/2012            The following portions of the patient's history were reviewed and updated as appropriate: allergies, current medications, past family history, past medical history, past social history, past surgical history and problem list      Past Medical History:   Diagnosis Date    GERD (gastroesophageal reflux disease)     Peptic ulcer     RA (rheumatoid arthritis) ([facility] [address] )        Past Surgical History:   Procedure Laterality Date    APPENDECTOMY      COLONOSCOPY      MOLE EXCISION      Back of head     TUBAL LIGATION  UPPER GASTROINTESTINAL ENDOSCOPY         Family History   Problem Relation Age of Onset   Mary Hopper Breast cancer Mother     Pancreatic cancer Mother     Cervical cancer Sister     Ovarian cancer Maternal Grandmother     Breast cancer Maternal Aunt     Throat cancer Maternal Aunt     Stomach cancer Maternal Aunt          Medications have been verified  Objective   Pulse (!) 106   Temp 100 4 °F (38 °C)   Resp 20   Ht 5' 4 75" (1 645 m)   Wt 71 1 kg (156 lb 11 2 oz)   SpO2 98%   BMI 26 28 kg/m²   No LMP recorded  Physical Exam     Physical Exam  Vitals and nursing note reviewed  Constitutional:       General: She is not in acute distress  Appearance: Normal appearance  She is well-developed  She is ill-appearing  She is not diaphoretic  HENT:      Head: Normocephalic and atraumatic  Right Ear: Tympanic membrane, ear canal and external ear normal       Left Ear: Tympanic membrane, ear canal and external ear normal       Nose: Congestion and rhinorrhea present  Mouth/Throat:      Mouth: Mucous membranes are moist       Pharynx: Oropharynx is clear  No oropharyngeal exudate or posterior oropharyngeal erythema  Eyes:      General:         Right eye: No discharge  Left eye: No discharge  Conjunctiva/sclera: Conjunctivae normal       Pupils: Pupils are equal, round, and reactive to light  Cardiovascular:      Rate and Rhythm: Normal rate and regular rhythm  Heart sounds: Normal heart sounds  Pulmonary:      Effort: Pulmonary effort is normal  No respiratory distress  Breath sounds: Normal breath sounds  No stridor  No wheezing, rhonchi or rales  Musculoskeletal:      Cervical back: Normal range of motion and neck supple  Lymphadenopathy:      Cervical: No cervical adenopathy  Skin:     General: Skin is warm and dry  Capillary Refill: Capillary refill takes less than 2 seconds  Findings: No rash     Neurological:      Mental Status: She is alert and oriented to person, place, and time  Cranial Nerves: No cranial nerve deficit  Sensory: No sensory deficit  Psychiatric:         Behavior: Behavior normal          Thought Content:  Thought content normal

## 2022-05-03 NOTE — LETTER
[id number]  Dept: [phone]    May 3, 2022    Patient: Enrigue Collet  YOB: 1965    Enrigue Collet was seen and evaluated at our [facility]  Please note if Covid and Flu tests are negative, they may return to work when fever free for 24 hours without the use of a fever reducing agent  If Covid or Flu test is positive, they may return to work on 5/7/2022, as this is 5 days from the onset of symptoms  Upon return, they must then adhere to strict masking for an additional 5 days      Sincerely,        Lisbeth Zamora PA-C

## 2022-05-04 LAB
FLUAV RNA RESP QL NAA+PROBE: NEGATIVE
FLUBV RNA RESP QL NAA+PROBE: NEGATIVE
SARS-COV-2 RNA RESP QL NAA+PROBE: NEGATIVE

## 2022-05-06 ENCOUNTER — TELEPHONE (OUTPATIENT)
Dept: ADMINISTRATIVE | Facility: OTHER | Age: 57
End: 2022-05-06

## 2022-05-06 ENCOUNTER — OFFICE VISIT (OUTPATIENT)
Dept: FAMILY MEDICINE CLINIC | Facility: CLINIC | Age: 57
End: 2022-05-06
Payer: COMMERCIAL

## 2022-05-06 VITALS
WEIGHT: 153.6 LBS | HEART RATE: 92 BPM | BODY MASS INDEX: 25.59 KG/M2 | TEMPERATURE: 97.7 F | OXYGEN SATURATION: 100 % | SYSTOLIC BLOOD PRESSURE: 142 MMHG | DIASTOLIC BLOOD PRESSURE: 74 MMHG | HEIGHT: 65 IN

## 2022-05-06 DIAGNOSIS — E78.2 MIXED HYPERLIPIDEMIA: ICD-10-CM

## 2022-05-06 DIAGNOSIS — B35.4 TINEA CORPORIS: ICD-10-CM

## 2022-05-06 DIAGNOSIS — J01.01 ACUTE RECURRENT MAXILLARY SINUSITIS: Primary | ICD-10-CM

## 2022-05-06 PROCEDURE — 99214 OFFICE O/P EST MOD 30 MIN: CPT | Performed by: NURSE PRACTITIONER

## 2022-05-06 RX ORDER — PREDNISONE 10 MG/1
TABLET ORAL
Qty: 30 TABLET | Refills: 0 | Status: SHIPPED | OUTPATIENT
Start: 2022-05-06

## 2022-05-06 RX ORDER — KETOCONAZOLE 20 MG/G
CREAM TOPICAL DAILY
Qty: 15 G | Refills: 0 | Status: SHIPPED | OUTPATIENT
Start: 2022-05-06

## 2022-05-06 RX ORDER — AZITHROMYCIN 250 MG/1
TABLET, FILM COATED ORAL
Qty: 6 TABLET | Refills: 0 | Status: SHIPPED | OUTPATIENT
Start: 2022-05-06 | End: 2022-05-11

## 2022-05-06 NOTE — PROGRESS NOTES
Assessment and Plan:    Problem List Items Addressed This Visit        Respiratory    Acute recurrent maxillary sinusitis - Primary     Azithromycin and prednisone taper ordered to treat sinusitis  Patient was advised to continue Claritin D  Relevant Medications    azithromycin (Zithromax) 250 mg tablet    predniSONE 10 mg tablet       Musculoskeletal and Integument    Tinea corporis     Ketoconazole cream ordered to be used on affected areas  Patient did have an allergic reaction to Diflucan in the past but denies ever being on a topical antifungal   Patient was advised that she begins to develop any new rashes, scratchy throat, or shortness of breath she must discontinue the cream immediately and make the office aware  Relevant Medications    ketoconazole (NIZORAL) 2 % cream       Other    Mixed hyperlipidemia     Lipid panel ordered to be drawn prior to next office visit  Diagnoses and all orders for this visit:    Acute recurrent maxillary sinusitis  -     azithromycin (Zithromax) 250 mg tablet; Take 2 tablets (500 mg total) by mouth daily for 1 day, THEN 1 tablet (250 mg total) daily for 4 days  -     predniSONE 10 mg tablet; Use 5 tablets for 2 days  Use 4 tablets for 2 days  Use 3 tablets for 2 days  Use 2 tablets for 2 days  Use 1 tablet for 2 days  Tinea corporis  -     ketoconazole (NIZORAL) 2 % cream; Apply topically daily    Mixed hyperlipidemia            Subjective:      Patient ID: Dom Judge is a 64 y o  female  CC:    Chief Complaint   Patient presents with    Nasal Congestion     flu/ covid swab neg done earlier this week during urgent care visit  symptoms started sunday states she had fever on monday    Sinus Problem    Earache    Facial Swelling     eye and right side of face   Cough     states she is no longer taking tesslon or tamtiflu       HPI:    URI symptoms:  Patient was seen in urgent care on 05/03/2022 for URI symptoms  Patient was tested for COVID and influenza at that time which were both found to be negative  Patient was started on Tamiflu and Tessalon Perles at that time as they felt that the likelihood that she had influenza was high  The patient is currently having symptoms of slight edema below her bilateral eyes, maxillary sinus pressure and congestion, rhinorrhea, nasal congestion, PND, and productive cough  She denies fevers, chills, or SOB  The patient reports that she never began Tamiflu or Tessalon Perles as she did see the negative influenza test prior to picking up medications  She is currently taking Claritin D daily with no relief of her symptoms  She denies any vision changes  Patient is fully vaccinated against COVID  Hyperlipidemia:  Patient's most recent lipid panel showed elevated total cholesterol and LDL  Patient is not currently taking cholesterol medication  Rash:  Patient reports that recently since she began going to the gym she noted small round areas of rash on her upper chest and back area  She does report associated pruritus in these areas but no drainage, surrounding erythema, or warmth to touch  The following portions of the patient's history were reviewed and updated as appropriate: allergies, current medications, past family history, past medical history, past social history, past surgical history and problem list       Review of Systems   Constitutional: Negative for chills and fever  HENT: Positive for congestion, postnasal drip, rhinorrhea, sinus pressure and sinus pain (maxillary)  Negative for ear pain and sore throat  Eyes: Negative for photophobia, pain, discharge, redness, itching and visual disturbance  Edema below eyes  Respiratory: Positive for cough (productive )  Negative for chest tightness, shortness of breath and wheezing  Cardiovascular: Negative for chest pain, palpitations and leg swelling     Gastrointestinal: Negative for abdominal pain, constipation, diarrhea, nausea and vomiting  Endocrine: Negative for cold intolerance and heat intolerance  Genitourinary: Negative for decreased urine volume, dysuria and hematuria  Musculoskeletal: Negative for arthralgias, back pain and myalgias  Skin: Negative for color change and rash  Allergic/Immunologic: Positive for environmental allergies  Neurological: Negative for dizziness, seizures, syncope, weakness, light-headedness, numbness and headaches  Hematological: Negative for adenopathy  Psychiatric/Behavioral: Negative for confusion  The patient is not nervous/anxious  All other systems reviewed and are negative  Data to review:       Objective:    Vitals:    05/06/22 0836   BP: 142/74   BP Location: Right arm   Patient Position: Sitting   Cuff Size: Adult   Pulse: 92   Temp: 97 7 °F (36 5 °C)   TempSrc: Temporal   SpO2: 100%   Weight: 69 7 kg (153 lb 9 6 oz)   Height: 5' 4 75" (1 645 m)        Physical Exam  Vitals and nursing note reviewed  Constitutional:       General: She is not in acute distress  Appearance: Normal appearance  She is well-developed  She is not ill-appearing  HENT:      Head: Normocephalic and atraumatic  Right Ear: Hearing and tympanic membrane normal       Left Ear: Hearing and tympanic membrane normal       Mouth/Throat:      Pharynx: No pharyngeal swelling, oropharyngeal exudate, posterior oropharyngeal erythema or uvula swelling  Tonsils: No tonsillar exudate or tonsillar abscesses  Eyes:      Conjunctiva/sclera: Conjunctivae normal    Cardiovascular:      Rate and Rhythm: Normal rate and regular rhythm  Pulses: Normal pulses  Carotid pulses are 2+ on the right side and 2+ on the left side  Radial pulses are 2+ on the right side and 2+ on the left side  Posterior tibial pulses are 2+ on the right side and 2+ on the left side  Heart sounds: Normal heart sounds  No murmur heard        Pulmonary: Effort: Pulmonary effort is normal  No respiratory distress  Breath sounds: Normal breath sounds  No wheezing or rhonchi  Abdominal:      General: Abdomen is flat  Bowel sounds are normal  There is no distension  Palpations: Abdomen is soft  Tenderness: There is no abdominal tenderness  There is no guarding  Musculoskeletal:         General: Normal range of motion  Cervical back: Normal range of motion and neck supple  Right lower leg: No edema  Left lower leg: No edema  Skin:     General: Skin is warm and dry  Capillary Refill: Capillary refill takes less than 2 seconds  Findings: Rash present  Rash is macular  Comments: Multiple round, red, macules with centralized clearing noted on the patient's upper chest and upper back area  No satellite lesions, surrounding erythema, or drainage was noted from these areas  Rash was consistent with tinea corporis  Neurological:      General: No focal deficit present  Mental Status: She is alert and oriented to person, place, and time  Psychiatric:         Mood and Affect: Mood normal          Behavior: Behavior normal          Thought Content:  Thought content normal          Judgment: Judgment normal

## 2022-05-06 NOTE — ASSESSMENT & PLAN NOTE
Ketoconazole cream ordered to be used on affected areas  Patient did have an allergic reaction to Diflucan in the past but denies ever being on a topical antifungal   Patient was advised that she begins to develop any new rashes, scratchy throat, or shortness of breath she must discontinue the cream immediately and make the office aware

## 2022-05-06 NOTE — ASSESSMENT & PLAN NOTE
Azithromycin and prednisone taper ordered to treat sinusitis    Patient was advised to continue Claritin D

## 2022-05-06 NOTE — TELEPHONE ENCOUNTER
----- Message from Gigi Lang MA sent at 5/6/2022  8:10 AM EDT -----  Regarding: care gap request  05/06/22 8:10 AM    Hello, our patient attached above has had Mammogram completed/performed  Please assist in updating the patient chart by pulling the Care Everywhere (CE) document  The date of service is 10/2021       Thank you,  Gigi Lang MA  [facility] PRIMARY [facility]ARE

## 2022-05-06 NOTE — TELEPHONE ENCOUNTER
Upon review of the In Basket request we were able to locate, review, and update the patient chart as requested for Mammogram     Any additional questions or concerns should be emailed to the Practice Liaisons via [email or web address] email, please do not reply via In Basket      Thank you  Tamera Ramos

## 2022-05-06 NOTE — LETTER
May 6, 2022     Patient: Enrigue Collet  YOB: 1965  Date of Visit: 5/6/2022      To Whom it May Concern:    Enrigue Collet is under my professional care  Manny Wilson was seen in my office on 5/6/2022  Manny Wilson was being evaluated for a medical condition and is excused to return to present to work late this morning and work the rest of her shift  If you have any questions or concerns, please don't hesitate to call           Sincerely,          PAYAM Lock        CC: No Recipients

## 2022-07-28 ENCOUNTER — OFFICE VISIT (OUTPATIENT)
Dept: OBGYN CLINIC | Facility: OTHER | Age: 57
End: 2022-07-28
Payer: COMMERCIAL

## 2022-07-28 ENCOUNTER — APPOINTMENT (OUTPATIENT)
Dept: RADIOLOGY | Facility: OTHER | Age: 57
End: 2022-07-28
Payer: COMMERCIAL

## 2022-07-28 VITALS
DIASTOLIC BLOOD PRESSURE: 76 MMHG | SYSTOLIC BLOOD PRESSURE: 122 MMHG | HEIGHT: 64 IN | WEIGHT: 156 LBS | HEART RATE: 79 BPM | HEIGHT: 64 IN | BODY MASS INDEX: 26.63 KG/M2 | HEART RATE: 79 BPM | BODY MASS INDEX: 26.63 KG/M2 | WEIGHT: 156 LBS | SYSTOLIC BLOOD PRESSURE: 122 MMHG | DIASTOLIC BLOOD PRESSURE: 76 MMHG

## 2022-07-28 DIAGNOSIS — M25.512 ACUTE PAIN OF BOTH SHOULDERS: Primary | ICD-10-CM

## 2022-07-28 DIAGNOSIS — M25.511 ACUTE PAIN OF RIGHT SHOULDER: ICD-10-CM

## 2022-07-28 DIAGNOSIS — M75.52 SUBACROMIAL BURSITIS OF BOTH SHOULDERS: ICD-10-CM

## 2022-07-28 DIAGNOSIS — M25.511 ACUTE PAIN OF BOTH SHOULDERS: Primary | ICD-10-CM

## 2022-07-28 DIAGNOSIS — M75.51 SUBACROMIAL BURSITIS OF BOTH SHOULDERS: ICD-10-CM

## 2022-07-28 DIAGNOSIS — M25.512 ACUTE PAIN OF LEFT SHOULDER: ICD-10-CM

## 2022-07-28 DIAGNOSIS — M75.82 TENDONITIS OF BOTH ROTATOR CUFFS: ICD-10-CM

## 2022-07-28 DIAGNOSIS — M75.81 TENDONITIS OF BOTH ROTATOR CUFFS: ICD-10-CM

## 2022-07-28 PROCEDURE — 99204 OFFICE O/P NEW MOD 45 MIN: CPT | Performed by: ORTHOPAEDIC SURGERY

## 2022-07-28 PROCEDURE — 20610 DRAIN/INJ JOINT/BURSA W/O US: CPT | Performed by: ORTHOPAEDIC SURGERY

## 2022-07-28 PROCEDURE — 73030 X-RAY EXAM OF SHOULDER: CPT

## 2022-07-28 RX ORDER — BETAMETHASONE SODIUM PHOSPHATE AND BETAMETHASONE ACETATE 3; 3 MG/ML; MG/ML
6 INJECTION, SUSPENSION INTRA-ARTICULAR; INTRALESIONAL; INTRAMUSCULAR; SOFT TISSUE
Status: COMPLETED | OUTPATIENT
Start: 2022-07-28 | End: 2022-07-28

## 2022-07-28 RX ORDER — BUPIVACAINE HYDROCHLORIDE 2.5 MG/ML
2 INJECTION, SOLUTION INFILTRATION; PERINEURAL
Status: COMPLETED | OUTPATIENT
Start: 2022-07-28 | End: 2022-07-28

## 2022-07-28 RX ADMIN — BUPIVACAINE HYDROCHLORIDE 2 ML: 2.5 INJECTION, SOLUTION INFILTRATION; PERINEURAL at 13:54

## 2022-07-28 RX ADMIN — BETAMETHASONE SODIUM PHOSPHATE AND BETAMETHASONE ACETATE 6 MG: 3; 3 INJECTION, SUSPENSION INTRA-ARTICULAR; INTRALESIONAL; INTRAMUSCULAR; SOFT TISSUE at 13:54

## 2022-07-28 NOTE — PROGRESS NOTES
Assessment  Diagnoses and all orders for this visit:    Acute pain of both shoulders    Tendonitis of both rotator cuffs    Subacromial bursitis of both shoulders        Discussion and Plan:    The patient has an examination consistent with subacromial impingement syndrome of the bilateral shoulder  I have discussed with the patient the pathophysiology of this diagnosis and reviewed how the examination correlates with this diagnosis  Treatment options were discussed at length and after discussing these treatment options, the patient elected for and received a subacromial injection of corticosteroid (as described in the procedure note) with a prescription for referral to physical therapy  We will reevaluate the patient in 6-8 weeks  If the symptoms fail to improve with this treatment the patient would be indicated for further imaging in the form of an MRI scan of the RIGHT shoulder  Subjective:   Patient ID: Daniel Caal is a 64 y o  female      HPI    The patient presents with a chief complaint of bilateral shoulder pain right > left  The pain began several year(s) ago and is not associated with an acute injury  Patient has been treating with Dr Forest Rivero at [address] [place] [address] [facility] in [place] with intermittent CS injections every 6 mos  Patient was never referred to PT  The patient describes the pain as aching, dull and sharp in intensity,  intermittent in timing, and localizes the pain to the  bilateral subacromial joint  The pain is worse with movement and relieved by rest   The pain is not associated with numbness and tingling  The pain is not associated with constitutional symptoms  The patient is awoken at night by the pain            The following portions of the patient's history were reviewed and updated as appropriate: allergies, current medications, past family history, past medical history, past social history, past surgical history and problem list     Review of Systems   Constitutional: Negative for chills and fever    HENT: Negative for drooling and sneezing  Eyes: Negative for redness  Respiratory: Negative for cough and wheezing  Gastrointestinal: Negative for nausea and vomiting  Musculoskeletal:        Please see ortho exam   Psychiatric/Behavioral: Negative for behavioral problems  The patient is not nervous/anxious  Objective:  /76 (BP Location: Left arm, Patient Position: Sitting, Cuff Size: Adult)   Pulse 79   Ht 5' 4" (1 626 m)   Wt 70 8 kg (156 lb)   BMI 26 78 kg/m²       Right Shoulder Exam     Tenderness   The patient is experiencing no tenderness  Range of Motion   The patient has normal right shoulder ROM  Muscle Strength   Abduction: 5/5   External rotation: 5/5     Tests   Carranza test: positive  Impingement: positive    Other   Erythema: absent  Sensation: normal  Pulse: present      Left Shoulder Exam     Tenderness   The patient is experiencing no tenderness  Range of Motion   The patient has normal left shoulder ROM  Muscle Strength   Abduction: 5/5   External rotation: 5/5     Tests   Carranza test: positive  Impingement: positive    Other   Erythema: absent  Sensation: normal  Pulse: present             Physical Exam  Vitals reviewed  Constitutional:       Appearance: She is well-developed  Eyes:      Pupils: Pupils are equal, round, and reactive to light  Pulmonary:      Effort: Pulmonary effort is normal       Breath sounds: Normal breath sounds  Skin:     General: Skin is warm and dry  Neurological:      Mental Status: She is alert and oriented to person, place, and time  Psychiatric:         Behavior: Behavior normal          Thought Content: Thought content normal          Judgment: Judgment normal          Large joint arthrocentesis: bilateral subacromial bursa  Universal Protocol:  Consent: Verbal consent obtained    Consent given by: patient  Patient understanding: patient states understanding of the procedure being performed  Site marked: the operative site was marked  Patient identity confirmed: verbally with patient    Supporting Documentation  Indications: pain   Procedure Details  Location: shoulder - bilateral subacromial bursa  Needle size: 22 G  Ultrasound guidance: no  Approach: lateral    Medications (Right): 2 mL bupivacaine 0 25 %; 6 mg betamethasone acetate-betamethasone sodium phosphate 6 (3-3) mg/mLMedications (Left): 2 mL bupivacaine 0 25 %; 6 mg betamethasone acetate-betamethasone sodium phosphate 6 (3-3) mg/mL   Patient tolerance: patient tolerated the procedure well with no immediate complications  Dressing:  Sterile dressing applied        I have personally reviewed pertinent films in PACS and my interpretation is as follows  Bilateral shoulder x-rays demonstrates no fracture or dislocation      Scribe Attestation    I,:  Stuart Amador am acting as a scribe while in the presence of the attending physician :       I,:  Billie Cartagena MD personally performed the services described in this documentation    as scribed in my presence :

## 2022-07-28 NOTE — PATIENT INSTRUCTIONS

## 2022-08-08 ENCOUNTER — EVALUATION (OUTPATIENT)
Dept: PHYSICAL THERAPY | Facility: REHABILITATION | Age: 57
End: 2022-08-08
Payer: COMMERCIAL

## 2022-08-08 DIAGNOSIS — M75.52 SUBACROMIAL BURSITIS OF BOTH SHOULDERS: ICD-10-CM

## 2022-08-08 DIAGNOSIS — M25.511 ACUTE PAIN OF BOTH SHOULDERS: Primary | ICD-10-CM

## 2022-08-08 DIAGNOSIS — M75.51 SUBACROMIAL BURSITIS OF BOTH SHOULDERS: ICD-10-CM

## 2022-08-08 DIAGNOSIS — M75.81 TENDONITIS OF BOTH ROTATOR CUFFS: ICD-10-CM

## 2022-08-08 DIAGNOSIS — M75.82 TENDONITIS OF BOTH ROTATOR CUFFS: ICD-10-CM

## 2022-08-08 DIAGNOSIS — M25.512 ACUTE PAIN OF BOTH SHOULDERS: Primary | ICD-10-CM

## 2022-08-08 PROCEDURE — 97112 NEUROMUSCULAR REEDUCATION: CPT

## 2022-08-08 PROCEDURE — 97161 PT EVAL LOW COMPLEX 20 MIN: CPT

## 2022-08-08 NOTE — PROGRESS NOTES
PT Evaluation     Today's date: 2022  Patient name: Damon Faria  : 1965  MRN: [id number]  Referring provider: Pat Landon  Dx:   Encounter Diagnosis     ICD-10-CM    1  Acute pain of both shoulders  M25 511 Ambulatory Referral to Physical Therapy    M25 512    2  Tendonitis of both rotator cuffs  M75 81 Ambulatory Referral to Physical Therapy    M75 82    3  Subacromial bursitis of both shoulders  M75 51 Ambulatory Referral to Physical Therapy    M75 52        Start Time: 1700  Stop Time: 1740  Total time in clinic (min): 40 minutes    Assessment  Assessment details: Damon Faria is a pleasant 64 y o  female who presents with bilateral shoulder pain, but right is worse than left  She has decreased strength and stability of her RTC and scapular stabilizers resulting in the pain she is experiencing, worry over not knowing what's wrong, concern at no signs of improvement and fear of not being able to keep active  No further referral appears necessary at this time based upon examination results  I expect she will improve in 4-6 weeks  Positive prognostic indicators include positive attitude toward recovery and good understanding of diagnosis and treatment plan options  Negative prognostic indicators include chronicity of symptoms and none  [place] would benefit from skilled physical therapy to address her impairments and help return her to lifting and her recreational activities without limitation  Problem List:  1) Decreased RTC strength  2) Decreased scap stabilizer strength  Impairments: abnormal or restricted ROM, activity intolerance, impaired physical strength, lacks appropriate home exercise program, pain with function, weight-bearing intolerance and poor posture   Understanding of Dx/Px/POC: good   Prognosis: good    Goals  ST-4 weeks  Patient will be independent with home exercise program    Patient will be able to manage symptoms independently    Patient will decrease pain by 25-50%    LTG: by discharge  Patient will improve FOTO to goal  Patient will report minimal (1-2/10) pain with aggravating activities to display improvements in overall functional status  Patient will go to her kick boxing class without being limited by her shoulder  Patient will lift one case of water with minimal (1-2/10) pain to display improved strength and stability of her shoulders    Plan  Patient would benefit from: skilled physical therapy  Planned modality interventions: cryotherapy, thermotherapy: hydrocollator packs and unattended electrical stimulation  Planned therapy interventions: IADL retraining, joint mobilization, manual therapy, massage, ADL training, activity modification, abdominal trunk stabilization, ADL retraining, balance, balance/weight bearing training, neuromuscular re-education, body mechanics training, behavior modification, strengthening, stretching, therapeutic activities, therapeutic exercise, therapeutic training, transfer training, graded exercise, graded motor, home exercise program, graded activity, gait training, functional ROM exercises, patient education, postural training and flexibility  Frequency: 2x week  Duration in visits: 16  Duration in weeks: 8  Treatment plan discussed with: patient        Subjective Evaluation    History of Present Illness  Mechanism of injury: Ban Blum is a 64 y o  female presenting to physical therapy on 08/08/22 with referral from MD for bilateral shoulder pain R>L  Reports she used to get injections at [facility]  Has not been to PT before  Denies neck pain  Reports some pain in her right forearm and some numbness and tingling in her right hand  Reports she was playing racquetball with her son awhile ago and swung hard and feels her shoulder has never been the same since  Lifting activities are very bothersome  Reports pain at night when trying to sleep  Has tried heat, ice, Biofreeze without relief   Reports relief following her most recent injection  When she lets her arm hang at her side it hurts more than holding it against her  Quality of life: good    Pain  Current pain ratin  At best pain ratin  At worst pain ratin  Location: R shoulder in the front  Quality: sharp and dull ache (pulling sensation)  Relieving factors: change in position, medications and support  Aggravating factors: lifting and overhead activity  Progression: worsening      Diagnostic Tests  X-ray: abnormal  Treatments  Previous treatment: injection treatment  Patient Goals  Patient goals for therapy: independence with ADLs/IADLs, return to sport/leisure activities, increased strength, decreased pain and increased motion  Patient goal: would like to go back to the gym         Objective  Myotomes all intact b/l   Dermatome: all intact b/l         Reflexes:  C5-6: 2+ b/l   C5-6: 2+ b/l     C7: 2+ b/l              MMT         AROM          PROM    Shoulder       L       R        L           R      L     R   Flex   4+ 3+ P WFL WFL     Abd  4 3 P WFL WFL     IR  5 5 WFL WFL     ER  4 3+ P WFL WFL              Rhomboid         Mid Trap         Low Trap         Serratus         Infraspnatus         Teres Major         Sub Scap             Rotator Cuff Testing:  ER Lag: negative   Drop Arm: negative   Painful Arc Sign: positive    Impingement Testing:   Sanket: positive  Infraspinatus MMT: positive  Painful Arc Sign: positive    Neuro Dynamic Testing:  Median Nerve: L= (-)   R= (-)  Ulnar Nerve: L= (-)  R= (-)        Segmental mobility:   GHJ: hypomobile in posterior     CTJ: hypomobile  TS: hypomobile       Precautions: DMII    Manuals                                                                 Neuro Re-Ed             No moneys HEP            scap retractions HEP            XS I's/T's             XS robberies             Shoulder sphere             jessica rows             Suitcase carry             [employer] HEP/education 10'            Ther Ex 8/8            UBE                                                                                                        Ther Activity                                       Gait Training                                       Modalities

## 2022-08-11 ENCOUNTER — OFFICE VISIT (OUTPATIENT)
Dept: PHYSICAL THERAPY | Facility: REHABILITATION | Age: 57
End: 2022-08-11
Payer: COMMERCIAL

## 2022-08-11 DIAGNOSIS — M75.82 TENDONITIS OF BOTH ROTATOR CUFFS: ICD-10-CM

## 2022-08-11 DIAGNOSIS — M75.81 TENDONITIS OF BOTH ROTATOR CUFFS: ICD-10-CM

## 2022-08-11 DIAGNOSIS — M25.512 ACUTE PAIN OF BOTH SHOULDERS: Primary | ICD-10-CM

## 2022-08-11 DIAGNOSIS — M75.52 SUBACROMIAL BURSITIS OF BOTH SHOULDERS: ICD-10-CM

## 2022-08-11 DIAGNOSIS — M25.511 ACUTE PAIN OF BOTH SHOULDERS: Primary | ICD-10-CM

## 2022-08-11 DIAGNOSIS — M75.51 SUBACROMIAL BURSITIS OF BOTH SHOULDERS: ICD-10-CM

## 2022-08-11 PROCEDURE — 97110 THERAPEUTIC EXERCISES: CPT | Performed by: PHYSICAL MEDICINE & REHABILITATION

## 2022-08-11 PROCEDURE — 97140 MANUAL THERAPY 1/> REGIONS: CPT | Performed by: PHYSICAL MEDICINE & REHABILITATION

## 2022-08-11 PROCEDURE — 97112 NEUROMUSCULAR REEDUCATION: CPT | Performed by: PHYSICAL MEDICINE & REHABILITATION

## 2022-08-15 ENCOUNTER — APPOINTMENT (OUTPATIENT)
Dept: PHYSICAL THERAPY | Facility: REHABILITATION | Age: 57
End: 2022-08-15
Payer: COMMERCIAL

## 2022-08-17 ENCOUNTER — OFFICE VISIT (OUTPATIENT)
Dept: PHYSICAL THERAPY | Facility: REHABILITATION | Age: 57
End: 2022-08-17
Payer: COMMERCIAL

## 2022-08-17 DIAGNOSIS — M75.52 SUBACROMIAL BURSITIS OF BOTH SHOULDERS: ICD-10-CM

## 2022-08-17 DIAGNOSIS — M25.512 ACUTE PAIN OF BOTH SHOULDERS: Primary | ICD-10-CM

## 2022-08-17 DIAGNOSIS — M75.51 SUBACROMIAL BURSITIS OF BOTH SHOULDERS: ICD-10-CM

## 2022-08-17 DIAGNOSIS — M75.81 TENDONITIS OF BOTH ROTATOR CUFFS: ICD-10-CM

## 2022-08-17 DIAGNOSIS — M75.82 TENDONITIS OF BOTH ROTATOR CUFFS: ICD-10-CM

## 2022-08-17 DIAGNOSIS — M25.511 ACUTE PAIN OF BOTH SHOULDERS: Primary | ICD-10-CM

## 2022-08-17 PROCEDURE — 97110 THERAPEUTIC EXERCISES: CPT | Performed by: PHYSICAL THERAPIST

## 2022-08-17 PROCEDURE — 97140 MANUAL THERAPY 1/> REGIONS: CPT | Performed by: PHYSICAL THERAPIST

## 2022-08-17 NOTE — PROGRESS NOTES
Daily Note     Today's date: 2022  Patient name: Wes Mejía  : 1965  MRN: [id number]  Referring provider: Quinton Martinez  Dx:   Encounter Diagnosis     ICD-10-CM    1  Acute pain of both shoulders  M25 511     M25 512    2  Tendonitis of both rotator cuffs  M75 81     M75 82    3  Subacromial bursitis of both shoulders  M75 51     M75 52                   Subjective: Reports disturbed sleep due to R shoulder, L has been okay since injection  Objective: See treatment diary below    Assessment: Increased pain w/ ER movements  Slightly more irritated afterwards  No effect w/ listed manual techniques  Plan: Continue per plan of care        Precautions: DMII    Manuals             [facility] STANLEY zapata MM                                                 Neuro Re-Ed           No moneys HEP            scap retractions HEP            XS I's/T's  Green I's 2x10           XS robberies             Shoulder sphere             jessica rows  Prone row 2x10           Suitcase carry             Wall slides  10x           scap stab at wall  20x CW/CCW           S/l abd             HEP/education 10'            Ther Ex           UBE  3' retro           S/L ER   2x10, 2# 8x           ER step out   2x5 YTB          TB IR   YTB 3x10                                                              Ther Activity                                       Gait Training                                       Modalities             CP   Post tx 10'

## 2022-08-18 ENCOUNTER — OFFICE VISIT (OUTPATIENT)
Dept: PHYSICAL THERAPY | Facility: REHABILITATION | Age: 57
End: 2022-08-18
Payer: COMMERCIAL

## 2022-08-18 DIAGNOSIS — M25.512 ACUTE PAIN OF BOTH SHOULDERS: Primary | ICD-10-CM

## 2022-08-18 DIAGNOSIS — M75.82 TENDONITIS OF BOTH ROTATOR CUFFS: ICD-10-CM

## 2022-08-18 DIAGNOSIS — M25.511 ACUTE PAIN OF BOTH SHOULDERS: Primary | ICD-10-CM

## 2022-08-18 DIAGNOSIS — M75.81 TENDONITIS OF BOTH ROTATOR CUFFS: ICD-10-CM

## 2022-08-18 DIAGNOSIS — M75.52 SUBACROMIAL BURSITIS OF BOTH SHOULDERS: ICD-10-CM

## 2022-08-18 DIAGNOSIS — M75.51 SUBACROMIAL BURSITIS OF BOTH SHOULDERS: ICD-10-CM

## 2022-08-18 PROCEDURE — 97140 MANUAL THERAPY 1/> REGIONS: CPT | Performed by: PHYSICAL MEDICINE & REHABILITATION

## 2022-08-18 PROCEDURE — 97014 ELECTRIC STIMULATION THERAPY: CPT | Performed by: PHYSICAL MEDICINE & REHABILITATION

## 2022-08-18 NOTE — PROGRESS NOTES
Daily Note     Today's date: 2022  Patient name: Alan Charlton  : 1965  MRN: [id number]  Referring provider: Leopold Barker  Dx:   Encounter Diagnosis     ICD-10-CM    1  Acute pain of both shoulders  M25 511     M25 512    2  Tendonitis of both rotator cuffs  M75 81     M75 82    3  Subacromial bursitis of both shoulders  M75 51     M75 52                   Subjective: Patient reports she was unable to sleep last night secondary to R shoulder pain  Objective: See treatment diary below    Assessment: Held AROM today secondary to elevated symptom irritability  Fair response to manual therapy as charted, TENS/CP to conclude  Assess response nv and continue as able  Plan: Continue per plan of care        Precautions: DMII    Manuals            [address] [facility] mobs,  MM LH             STM R UT/gissel,                                    Neuro Re-Ed          No moneys HEP   np         scap retractions HEP            XS I's/T's  Green I's 2x10           XS robberies             Shoulder sphere             jessica rows  Prone row 2x10           Suitcase carry             Wall slides  10x           scap stab at wall  20x CW/CCW           S/l abd             HEP/education 10'            Ther Ex           UBE  3' retro           S/L ER   2x10, 2# 8x           ER step out   2x5 YTB          TB IR   YTB 3x10                                                              Ther Activity                                       Gait Training                                       Modalities             CP   Post tx 10' CP/TENS 10' post

## 2022-08-22 ENCOUNTER — APPOINTMENT (OUTPATIENT)
Dept: PHYSICAL THERAPY | Facility: REHABILITATION | Age: 57
End: 2022-08-22
Payer: COMMERCIAL

## 2022-08-23 ENCOUNTER — OFFICE VISIT (OUTPATIENT)
Dept: PHYSICAL THERAPY | Facility: REHABILITATION | Age: 57
End: 2022-08-23
Payer: COMMERCIAL

## 2022-08-23 DIAGNOSIS — M75.52 SUBACROMIAL BURSITIS OF BOTH SHOULDERS: ICD-10-CM

## 2022-08-23 DIAGNOSIS — M75.82 TENDONITIS OF BOTH ROTATOR CUFFS: ICD-10-CM

## 2022-08-23 DIAGNOSIS — M25.511 ACUTE PAIN OF BOTH SHOULDERS: Primary | ICD-10-CM

## 2022-08-23 DIAGNOSIS — M25.512 ACUTE PAIN OF BOTH SHOULDERS: Primary | ICD-10-CM

## 2022-08-23 DIAGNOSIS — M75.81 TENDONITIS OF BOTH ROTATOR CUFFS: ICD-10-CM

## 2022-08-23 DIAGNOSIS — M75.51 SUBACROMIAL BURSITIS OF BOTH SHOULDERS: ICD-10-CM

## 2022-08-23 PROCEDURE — 97140 MANUAL THERAPY 1/> REGIONS: CPT

## 2022-08-23 PROCEDURE — 97110 THERAPEUTIC EXERCISES: CPT

## 2022-08-23 NOTE — PROGRESS NOTES
Daily Note     Today's date: 2022  Patient name: Bennie Beal  : 1965  MRN: [id number]  Referring provider: Valerio Moyer  Dx:   Encounter Diagnosis     ICD-10-CM    1  Acute pain of both shoulders  M25 511     M25 512    2  Tendonitis of both rotator cuffs  M75 81     M75 82    3  Subacromial bursitis of both shoulders  M75 51     M75 52        Start Time: 4504  Stop Time: 1800  Total time in clinic (min): 45 minutes    Subjective: Patient reports reports her pain continues to be constant in nature which has acute improvements following PT but gets more intense at night  Patient reports symptoms are generalized to right shoulder and radiate into forearm and into trap  Objective: See treatment diary below      Assessment: Improvement in right shoulder pain and AROM following central gliders and repeated retractions + ext  Updated HEP to include: ulnar gliders and repeated retractions + extensions  Patient would benefit from continued PT      Plan: Continue per plan of care        Precautions: DMII    Manuals           GH mobs, LH MM LH             STM R UT/lev, LH         Central Gliders     MN 3x30 Ulnar at 45 deg and 90 deg                     Neuro Re-Ed          No moneys HEP   np         scap retractions HEP            XS I's/T's  Green I's 2x10           XS robberies             Shoulder sphere             jessica rows  Prone row 2x10           Suitcase carry             Wall slides  10x           scap stab at wall  20x CW/CCW           S/l abd             HEP/education 10'            Ther Ex           UBE  3' retro           S/L ER   2x10, 2# 8x           ER step out   2x5 YTB          TB IR   YTB 3x10          Ulnar Gliders     2x10 HEP        Seated Retractions     x10        Seated Retractions + Ext     2x5 HEP                     Ther Activity                                       Gait Training Modalities    8/18         CP   Post tx 10' CP/TENS 10' post

## 2022-08-25 ENCOUNTER — OFFICE VISIT (OUTPATIENT)
Dept: PHYSICAL THERAPY | Facility: REHABILITATION | Age: 57
End: 2022-08-25
Payer: COMMERCIAL

## 2022-08-25 DIAGNOSIS — M75.81 TENDONITIS OF BOTH ROTATOR CUFFS: ICD-10-CM

## 2022-08-25 DIAGNOSIS — M25.511 ACUTE PAIN OF BOTH SHOULDERS: Primary | ICD-10-CM

## 2022-08-25 DIAGNOSIS — M25.512 ACUTE PAIN OF BOTH SHOULDERS: Primary | ICD-10-CM

## 2022-08-25 DIAGNOSIS — M75.51 SUBACROMIAL BURSITIS OF BOTH SHOULDERS: ICD-10-CM

## 2022-08-25 DIAGNOSIS — M75.52 SUBACROMIAL BURSITIS OF BOTH SHOULDERS: ICD-10-CM

## 2022-08-25 DIAGNOSIS — M75.82 TENDONITIS OF BOTH ROTATOR CUFFS: ICD-10-CM

## 2022-08-25 PROCEDURE — 97140 MANUAL THERAPY 1/> REGIONS: CPT | Performed by: PHYSICAL THERAPIST

## 2022-08-25 NOTE — PROGRESS NOTES
Daily Note     Today's date: 2022  Patient name: Guyann Goodpasture  : 1965  MRN: [id number]  Referring provider: Selam Berger  Dx:   Encounter Diagnosis     ICD-10-CM    1  Acute pain of both shoulders  M25 511     M25 512    2  Tendonitis of both rotator cuffs  M75 81     M75 82    3  Subacromial bursitis of both shoulders  M75 51     M75 52        Start Time: 1805  Stop Time: 95  Total time in clinic (min): 40 minutes    Subjective: Patient reports that she continues to have constant R shoulder pain along with pain with movement  Describes having at least 2 CSI per year for the last 2 5 years  Objective: See treatment diary below  Hypomobile CTJ and TS  Painful and limited shoulder AROM <50% flexion  Unable to perform active ER/IR    Assessment: Tolerated treatment well  Patient with resolved resting shoulder pain post session  She responded well to TS and CTJ mobilization  Post session was able to reach overhead nearly to full without pain  Extension had mild pain and end range ER hand mild pain  Education on performing a modified retraction extension every hour as long as symptoms improved  She did not do well with retraction + extension due to dizziness  Plan: Continue per plan of care        Precautions: DMII    Manuals        assessment      10'       L lateral glide      G4 4 bouts       Prone CTJ mob      G5       Central Gliders     LA 3x30 Ulnar at 45 deg and 90 deg        Supine mid TS mob      G5       Neuro Re-Ed        No moneys HEP   np         scap retractions HEP            XS I's/T's  Green I's 2x10           XS robberies             Shoulder sphere             jessica rows  Prone row 2x10           Suitcase carry             Wall slides  10x           scap stab at wall  20x CW/CCW           S/l abd             HEP/education 10'            Ther Ex           UBE  3' retro           S/L ER   2x10, 2# 8x           ER step out   2x5 YTB          TB IR   YTB 3x10          Ulnar Gliders     2x10 HEP        Seated Retractions     x10        Seated Retractions + Ext     2x5 HEP 2x5       Modified retraction + ext (butterfly)      5x       Ther Activity                                       Gait Training                                       Modalities    8/18         CP   Post tx 10' CP/TENS 10' post

## 2022-08-29 ENCOUNTER — TELEPHONE (OUTPATIENT)
Dept: OBGYN CLINIC | Facility: HOSPITAL | Age: 57
End: 2022-08-29

## 2022-08-29 ENCOUNTER — OFFICE VISIT (OUTPATIENT)
Dept: PHYSICAL THERAPY | Facility: REHABILITATION | Age: 57
End: 2022-08-29
Payer: COMMERCIAL

## 2022-08-29 DIAGNOSIS — M25.511 ACUTE PAIN OF BOTH SHOULDERS: Primary | ICD-10-CM

## 2022-08-29 DIAGNOSIS — M75.52 SUBACROMIAL BURSITIS OF BOTH SHOULDERS: ICD-10-CM

## 2022-08-29 DIAGNOSIS — M75.51 SUBACROMIAL BURSITIS OF BOTH SHOULDERS: ICD-10-CM

## 2022-08-29 DIAGNOSIS — M25.512 ACUTE PAIN OF BOTH SHOULDERS: Primary | ICD-10-CM

## 2022-08-29 DIAGNOSIS — M75.81 TENDONITIS OF BOTH ROTATOR CUFFS: ICD-10-CM

## 2022-08-29 DIAGNOSIS — M75.82 TENDONITIS OF BOTH ROTATOR CUFFS: ICD-10-CM

## 2022-08-29 PROCEDURE — 97140 MANUAL THERAPY 1/> REGIONS: CPT

## 2022-08-29 NOTE — TELEPHONE ENCOUNTER
We did bilateral steroid injections 4 weeks ago  We would not give steroids if she isn't improving  We would recommend Tylenol and OTC medication  If therapy is making symptoms worse, she should stop  If symptoms persist, then we discussed getting a MRI to look for structural pathology that may be causing her pain    Steroids would prevent us from offering her anything surgical for months and should be avoided

## 2022-08-29 NOTE — TELEPHONE ENCOUNTER
Patient is currently attending PT and was told she has a lot of inflammation  She was told to call the office to see if they could prescribe a steroid and have this sent to her pharmacy  Patient would like a call at [phone]

## 2022-08-29 NOTE — PROGRESS NOTES
Daily Note     Today's date: 2022  Patient name: Helen Post  : 1965  MRN: [id number]  Referring provider: Jyoti Griffith*  Dx:   Encounter Diagnosis     ICD-10-CM    1  Acute pain of both shoulders  M25 511     M25 512    2  Tendonitis of both rotator cuffs  M75 81     M75 82    3  Subacromial bursitis of both shoulders  M75 51     M75 52        Start Time: 1700  Stop Time: 1746  Total time in clinic (min): 46 minutes    Subjective: Patient reports that the pain is worse and she can not sleep  States she reached out to the doctor about getting a steroid  Objective: See treatment diary below    [place] of R scalene  Hypomobility first rib  Restrictions in R UT and infraspinatus    Assessment: Tolerated treatment well  Reproduction of discomfort with feeling down the arm during infraspinatus palpation and soft tissue work  Improved shoulder mobility and decreased pain post treatment  Able to reach overhead fully without any pain following treatment  Will talk to pt tomorrow about how sleep goes tonight  Would benefit from continued PT to address her pain and limitations  Plan: Continue per plan of care        Precautions: DMII    Manuals    assessment      10' 15'   L lateral glide      G4 4 bouts    Prone CTJ mob      G5    Central Gliders     NM 3x30 Ulnar at 45 deg and 90 deg     Supine mid TS mob      G5    UT STM       5'   Scalene STM       5'   Infraspinatus STM       7'   Neuro Re-Ed    No moneys HEP   np      scap retractions HEP         XS I's/T's  Green I's 2x10        XS robberies          Shoulder sphere          jessica rows  Prone row 2x10        Suitcase carry          Wall slides  10x        scap stab at wall  20x CW/CCW        S/l abd          HEP/education 10'         Ther Ex    UBE  3' retro        S/L ER   2x10, 2# 8x        ER step out   2x5 YTB       TB IR   YTB 3x10       Ulnar Gliders     2x10 HEP     Seated Retractions     x10     Seated Retractions + Ext     2x5 HEP 2x5    Modified retraction + ext (butterfly)      5x    Ther Activity                              Gait Training                              Modalities    8/18      CP   Post tx 10' CP/TENS 10' post

## 2022-08-30 DIAGNOSIS — M24.811 INTERNAL DERANGEMENT OF RIGHT SHOULDER: Primary | ICD-10-CM

## 2022-08-30 PROBLEM — M24.812 INTERNAL DERANGEMENT OF LEFT SHOULDER: Status: ACTIVE | Noted: 2022-08-30

## 2022-08-30 NOTE — TELEPHONE ENCOUNTER
MRI right shoulder ordered  Please schedule  The follow up with me on 9/12 needs to be cancelled  A new follow up should be provided with Dr Sara Harrington to discuss the results of the MRI within a few days of the MRI  We will not discuss any results over the phone     Therapist clarified - he did not tell patient to call our office to get steroids    He confirmed with other staff so he is unsure where this comment from patient came from

## 2022-08-30 NOTE — TELEPHONE ENCOUNTER
Patient called back and wants a MRI, she was not nice on the phone and was frustrated      Please Advise  CB: [phone]

## 2022-09-01 ENCOUNTER — APPOINTMENT (OUTPATIENT)
Dept: PHYSICAL THERAPY | Facility: REHABILITATION | Age: 57
End: 2022-09-01
Payer: COMMERCIAL

## 2022-09-07 ENCOUNTER — HOSPITAL ENCOUNTER (OUTPATIENT)
Dept: MRI IMAGING | Facility: HOSPITAL | Age: 57
Discharge: HOME | End: 2022-09-07
Payer: COMMERCIAL

## 2022-09-07 DIAGNOSIS — M24.811 INTERNAL DERANGEMENT OF RIGHT SHOULDER: ICD-10-CM

## 2022-09-07 PROCEDURE — G1004 CDSM NDSC: HCPCS

## 2022-09-07 PROCEDURE — 73221 MRI JOINT UPR EXTREM W/O DYE: CPT

## 2022-09-08 ENCOUNTER — OFFICE VISIT (OUTPATIENT)
Dept: OBGYN CLINIC | Facility: OTHER | Age: 57
End: 2022-09-08
Payer: COMMERCIAL

## 2022-09-08 ENCOUNTER — PREP FOR PROCEDURE (OUTPATIENT)
Dept: OBGYN CLINIC | Facility: OTHER | Age: 57
End: 2022-09-08

## 2022-09-08 VITALS
SYSTOLIC BLOOD PRESSURE: 128 MMHG | HEIGHT: 64 IN | SYSTOLIC BLOOD PRESSURE: 128 MMHG | HEART RATE: 89 BPM | HEART RATE: 89 BPM | HEIGHT: 64 IN | BODY MASS INDEX: 26.78 KG/M2 | DIASTOLIC BLOOD PRESSURE: 80 MMHG | DIASTOLIC BLOOD PRESSURE: 80 MMHG | BODY MASS INDEX: 26.78 KG/M2

## 2022-09-08 DIAGNOSIS — M75.111 NONTRAUMATIC INCOMPLETE TEAR OF RIGHT ROTATOR CUFF: Primary | ICD-10-CM

## 2022-09-08 PROCEDURE — 99214 OFFICE O/P EST MOD 30 MIN: CPT | Performed by: ORTHOPAEDIC SURGERY

## 2022-09-08 RX ORDER — CHLORHEXIDINE GLUCONATE 0.12 MG/ML
15 RINSE ORAL ONCE
Status: CANCELLED | OUTPATIENT
Start: 2022-09-08 | End: 2022-09-08

## 2022-09-08 NOTE — PATIENT INSTRUCTIONS
You are being scheduled for a shoulder arthroscopy to treat your symptoms  Below are some instructions and information on what to expect before and after your surgery  Pre-Surgical Preparation for Arthroscopic Shoulder Surgery: You will be contacted the evening prior to your surgery to confirm the scheduled time of the procedure and when to arrive at the hospital    Do not eat or drink anything after midnight the night before your surgery  Since you are having out-patient surgery, make sure that you have someone who can drive you home later in the day  Also, prepare that person for a long day, as the process of safely preparing for and recovering from the procedure is more time consuming than the actual procedure! As you will be in a sling after surgery, please wear or bring a loose fitting button-down shirt so that you can easily place this over the sling when you leave the surgical suite  This avoids having to place the operative arm in a sleeve  Most patients find that this is the easiest outfit to wear for the first week or so after surgery so you may want to plan accordingly  Most patients find that lying down in bed after shoulder surgery accentuates their discomfort  This is likely related to the effect of gravity on the swelling in the shoulder  As a result, most patients sleep better in a recliner or in bed with pillows propped up behind their back for the first few days or weeks after surgery  It is a good idea to plan for this ahead of time so there will be less hassle getting things set up the night after surgery  What to Expect After Arthroscopic Shoulder Surgery: It is normal to have swelling and discomfort in the shoulder for several days or a week after surgery  It is also normal to have a small amount of drainage from the surgical wounds (especially the first few days after surgery), as we put fluid into the shoulder to visualize the structures during surgery  It is NOT normal to have foul smelling, purulent drainage and if this is noted, please contact the office immediately or proceed to the emergency room for evaluation as this may indicate an infection  Applying ice bags to the shoulder may help with pain that is not controlled by the regional block  Ice should be applied 20-30 minutes at a time, every hour or two  Make sure to put a thin towel or T-shirt next to your skin to avoid direct contact of the ice with the skin  Icing is most helpful in the first 48 hours, although many people find that continuing past this time frame lessens their postoperative pain  Please note that your post-operative dressing is not conductive to ice, so if you need to, it is okay to remove that dressing even the night after surgery and place band-aids over the wounds in order for the ice to take effect  Pain Control    Most patients will receive a nerve block, the local anesthetic may keep your whole arm numb for up to 4 days  You will be given a prescription for narcotic pain medication when you are discharged from the hospital   With the newer nerve block that is being utilized, patients are rarely requiring the use of this narcotic pain medication  If you find you do not tolerate that type of pain medicine well, call our office and we will try another one  In addition to the narcotic pain medication, it is safe to use an anti-inflammatory (unless the patient has a medical condition that would not allow safe use of this mediation)  This includes the Advil, Motrin, Ibuprofen and Alleve category of medications  Simply follow the over the counter dosing on the package and use as indicated as another adjunct  Importantly since these medications are all very similar, use only one of them  Tylenol is a separate medication that can be utilized as well and can be taken at the same time as the other medication or given in a "staggered" manner    Just make sure that you follow the dosing on the over the counter bottle instructions  Also make sure that the pain medication prescribed by Dr Yandy Akins team does not contain acetaminophen (this is found in Percocet and Vicodin)  Typically we do not prescribe those types of pain medications but if for some reason that has been prescribed DO NOT add more Tylenol (acetaminophen) as you could end up taking too much of that medication  As mentioned above, most patients find that lying down accentuates their discomfort  You might sleep better in a recliner, or propped up in bed  Dr Cuong Sandra encourages patients to safely ambulate around the house as much as possible in the first few days after the procedure as this can help with blood circulation in the legs  While the incidence of blood clots is very rare following shoulder surgery, early ambulation is a great way to help decrease the already low rate  24 hours after the surgery you may remove the bandage and cover incisions with Band-Aids if needed  At that time you may shower, the wounds will have a surgical glue that will protect them from shower water but do not submerge your incisions directly (bathing or swimming) until at least 2 weeks post-operatively  It is safe to let the arm hang at the side and take a shower and put on a shirt without the sling on  Just make sure that you do not use the operative are to reach out and grab anything as that may damage the repair  When you are done showering and getting dressed please return the operative arm to the sling  Unless noted otherwise in your discharge paperwork, Dr Cuong Sandra uses absorbable sutures so they do not need to be removed  Dr Duran Tejeda physician assistant (PA) will see you in the office a few days after the procedure to review the intra-operative findings and to initiate physical therapy if appropriate    A post-operative appointment should have been scheduled for you already, but if for some reason this did not happen, please call the office to make one  Physical therapy is important after nearly all shoulder surgeries and a detailed rehabilitation plan based on the specific intra-operative findings and procedures will be provided to your therapist at the first post-operative office visit  Most patients have post-operative therapy appointments scheduled pre-operatively, but if you do not, that will be handled at the first post-operative office visit  Unless expressly directed otherwise it is safe to remove the sling even the first day after the surgery and let the arm hang by the side  This allows patients to shower and even put a shirt on (bad arm in the sleeve first)  It is also safe to flex and extend their wrist, hand and fingers as much as possible when the block wears off  These simple motions can serve to pump fluid out of the forearm and decrease swelling in the arm

## 2022-09-08 NOTE — PROGRESS NOTES
Assessment  Diagnoses and all orders for this visit:    Nontraumatic incomplete tear of infraspinatus tendon, right    Discussion and Plan:    · Explained to the patient that her MRI reveals a partial thickness tear of her infraspinatus tendon  Her examination is limited today due to pain but is worrisome for adhesive capsulitis  As patient has tried and failed a cortisone injection and physical therapy, she is a candidate for an arthroscopic procedure  Patient was instructed that we will exam her under anesthesia, if she has adhesive capsulitis during this examination then a capsular release will be performed  If she does not have adhesive capsulitis, then we will look at the rotator cuff and perform a repair or debridement as indicated  She understood and all questions were answered  · A thorough discussion was performed with the patient regarding the risks and benefit of operative and nonoperative treatment of their rotator cuff tear  Risks discussed include but were not limited to infection, neurovascular injury, recurrent tear, nonhealing of the repair, need for further surgery, need for biceps tenodesis or tenotomy, stiffness, need for prolonged rehabilitation, as well as the risk of anesthesia  After this discussion all questions were answered and informed consent was obtained in the office for arthroscopic rotator cuff debridement vs repair vs possible capsular release of the right shoulder  The patient will be scheduled for this procedure accordingly  · Follow up post operatively with Rex Medina PA-C    Subjective:   Patient ID: Carlo Grossman is a 62 y o  female    63 y/o female who presents today for a follow up visit for her right shoulder as well as to discuss MRI results  Patient reports that she has been attending physical therapy and performing a HEP without benefit  She actually reports that the pain is worsening and is now awakening her from sleep at night   She takes Advil prn for pain relief without benefit  Pain is worse with any motion of the shoulder  No numbness or tingling  No fevers or chills  The following portions of the patient's history were reviewed and updated as appropriate: allergies, current medications, past family history, past medical history, past social history, past surgical history and problem list     Objective:  /80   Pulse 89   Ht 5' 4" (1 626 m)   BMI 26 78 kg/m²       Right Shoulder Exam     Tenderness   The patient is experiencing no tenderness  Range of Motion   External rotation: normal   Right shoulder forward flexion: ROM limited due to pain and guarding  Muscle Strength   Abduction: 4/5   External rotation: 4/5     Other   Erythema: absent  Sensation: normal  Pulse: present    Comments:  Examination limited due to pain and guarding            Physical Exam  Vitals and nursing note reviewed  Constitutional:       Appearance: She is well-developed  HENT:      Head: Normocephalic and atraumatic  Eyes:      Pupils: Pupils are equal, round, and reactive to light  Cardiovascular:      Rate and Rhythm: Normal rate and regular rhythm  Pulses: Normal pulses  Heart sounds: Normal heart sounds  Pulmonary:      Effort: Pulmonary effort is normal  No respiratory distress  Breath sounds: Normal breath sounds  Abdominal:      General: There is no distension  Palpations: Abdomen is soft  Musculoskeletal:      Cervical back: Normal range of motion and neck supple  Skin:     General: Skin is warm and dry  Neurological:      Mental Status: She is alert and oriented to person, place, and time  Psychiatric:         Behavior: Behavior normal          Thought Content: Thought content normal          Judgment: Judgment normal        I have personally reviewed pertinent films in PACS and my interpretation is as follows  MRI Right Shoulder 9/7/2022: Partial thickness tear of the infraspinatus tendon   No full thickness tearing is appreciated       Scribe Attestation    I,:  Vita Johnson am acting as a scribe while in the presence of the attending physician :       I,:  Alison Ho MD personally performed the services described in this documentation    as scribed in my presence :

## 2022-09-08 NOTE — H&P (VIEW-ONLY)
Assessment  Diagnoses and all orders for this visit:    Nontraumatic incomplete tear of infraspinatus tendon, right    Discussion and Plan:    · Explained to the patient that her MRI reveals a partial thickness tear of her infraspinatus tendon  Her examination is limited today due to pain but is worrisome for adhesive capsulitis  As patient has tried and failed a cortisone injection and physical therapy, she is a candidate for an arthroscopic procedure  Patient was instructed that we will exam her under anesthesia, if she has adhesive capsulitis during this examination then a capsular release will be performed  If she does not have adhesive capsulitis, then we will look at the rotator cuff and perform a repair or debridement as indicated  She understood and all questions were answered  · A thorough discussion was performed with the patient regarding the risks and benefit of operative and nonoperative treatment of their rotator cuff tear  Risks discussed include but were not limited to infection, neurovascular injury, recurrent tear, nonhealing of the repair, need for further surgery, need for biceps tenodesis or tenotomy, stiffness, need for prolonged rehabilitation, as well as the risk of anesthesia  After this discussion all questions were answered and informed consent was obtained in the office for arthroscopic rotator cuff debridement vs repair vs possible capsular release of the right shoulder  The patient will be scheduled for this procedure accordingly  · Follow up post operatively with Britney Recinos PA-C    Subjective:   Patient ID: Renny Dalton is a 62 y o  female    63 y/o female who presents today for a follow up visit for her right shoulder as well as to discuss MRI results  Patient reports that she has been attending physical therapy and performing a HEP without benefit  She actually reports that the pain is worsening and is now awakening her from sleep at night   She takes Advil prn for pain relief without benefit  Pain is worse with any motion of the shoulder  No numbness or tingling  No fevers or chills  The following portions of the patient's history were reviewed and updated as appropriate: allergies, current medications, past family history, past medical history, past social history, past surgical history and problem list     Objective:  /80   Pulse 89   Ht 5' 4" (1 626 m)   BMI 26 78 kg/m²       Right Shoulder Exam     Tenderness   The patient is experiencing no tenderness  Range of Motion   External rotation: normal   Right shoulder forward flexion: ROM limited due to pain and guarding  Muscle Strength   Abduction: 4/5   External rotation: 4/5     Other   Erythema: absent  Sensation: normal  Pulse: present    Comments:  Examination limited due to pain and guarding            Physical Exam  Vitals and nursing note reviewed  Constitutional:       Appearance: She is well-developed  HENT:      Head: Normocephalic and atraumatic  Eyes:      Pupils: Pupils are equal, round, and reactive to light  Cardiovascular:      Rate and Rhythm: Normal rate and regular rhythm  Pulses: Normal pulses  Heart sounds: Normal heart sounds  Pulmonary:      Effort: Pulmonary effort is normal  No respiratory distress  Breath sounds: Normal breath sounds  Abdominal:      General: There is no distension  Palpations: Abdomen is soft  Musculoskeletal:      Cervical back: Normal range of motion and neck supple  Skin:     General: Skin is warm and dry  Neurological:      Mental Status: She is alert and oriented to person, place, and time  Psychiatric:         Behavior: Behavior normal          Thought Content: Thought content normal          Judgment: Judgment normal        I have personally reviewed pertinent films in PACS and my interpretation is as follows  MRI Right Shoulder 9/7/2022: Partial thickness tear of the infraspinatus tendon   No full thickness tearing is appreciated       Scribe Attestation    I,:  Vita Johnson am acting as a scribe while in the presence of the attending physician :       I,:  Alison Ho MD personally performed the services described in this documentation    as scribed in my presence :

## 2022-09-20 ENCOUNTER — TELEPHONE (OUTPATIENT)
Dept: OBGYN CLINIC | Facility: HOSPITAL | Age: 57
End: 2022-09-20

## 2022-09-20 NOTE — TELEPHONE ENCOUNTER
Please see previous notes    Patient is a [address]  Patient was informed by HR the form needs to be returned to them by 9/27  No later due to benefits / leave  may be denied or delayed  Surgery date is 9/23  Dropped off form, due to fax machine not working , on 9/12 to the [facility]  Please call patient asap    [address] [facility]  Fax # [phone]  [place] Jonah

## 2022-09-20 NOTE — TELEPHONE ENCOUNTER
Patient called inquiring about status of forms   Patient dropped forms off 9/12/2022  Will route to dr valencia clinical team

## 2022-09-22 ENCOUNTER — ANESTHESIA EVENT (OUTPATIENT)
Dept: PERIOP | Facility: AMBULARY SURGERY CENTER | Age: 57
End: 2022-09-22
Payer: COMMERCIAL

## 2022-09-22 NOTE — ANESTHESIA PREPROCEDURE EVALUATION
Procedure:  SHOULDER ARTHROSCOPIC ROTATOR CUFF REPAIR VERSUS DEBRIDEMENT (Right Shoulder)    Relevant Problems   CARDIO   (+) Mixed hyperlipidemia      ENDO   (+) Type 2 diabetes mellitus without complication, without long-term current use of insulin (HCC)      GI/HEPATIC   (+) Gastroesophageal reflux disease      Musculoskeletal and Integument   (+) Nontraumatic incomplete tear of infraspinatus tendon, right   RA (rheumatoid arthritis) (HCC)     Physical Exam    Airway    Mallampati score: II  TM Distance: >3 FB  Neck ROM: full     Dental       Cardiovascular      Pulmonary      Other Findings        Anesthesia Plan  ASA Score- 2     Anesthesia Type- general with ASA Monitors  Additional Monitors:   Airway Plan: LMA  Comment: IS Block for post op pain per surgeon request          Plan Factors-    Chart reviewed  Induction- intravenous  Postoperative Plan-     Informed Consent- Anesthetic plan and risks discussed with patient  I personally reviewed this patient with the CRNA  Discussed and agreed on the Anesthesia Plan with the KELSEY Cormier

## 2022-09-23 ENCOUNTER — ANESTHESIA (OUTPATIENT)
Dept: PERIOP | Facility: AMBULARY SURGERY CENTER | Age: 57
End: 2022-09-23
Payer: COMMERCIAL

## 2022-09-23 ENCOUNTER — HOSPITAL ENCOUNTER (OUTPATIENT)
Facility: AMBULARY SURGERY CENTER | Age: 57
Setting detail: OUTPATIENT SURGERY
Discharge: HOME | End: 2022-09-23
Attending: ORTHOPAEDIC SURGERY | Admitting: ORTHOPAEDIC SURGERY
Payer: COMMERCIAL

## 2022-09-23 VITALS
HEART RATE: 83 BPM | HEART RATE: 83 BPM | OXYGEN SATURATION: 92 % | TEMPERATURE: 97 F | SYSTOLIC BLOOD PRESSURE: 126 MMHG | DIASTOLIC BLOOD PRESSURE: 68 MMHG | TEMPERATURE: 97 F | RESPIRATION RATE: 16 BRPM | RESPIRATION RATE: 16 BRPM | OXYGEN SATURATION: 92 % | SYSTOLIC BLOOD PRESSURE: 126 MMHG | DIASTOLIC BLOOD PRESSURE: 68 MMHG

## 2022-09-23 DIAGNOSIS — M75.111 NONTRAUMATIC INCOMPLETE TEAR OF RIGHT ROTATOR CUFF: Primary | ICD-10-CM

## 2022-09-23 LAB
EXT PREGNANCY TEST URINE: NEGATIVE
EXT PREGNANCY TEST URINE: NEGATIVE
EXT. CONTROL: NORMAL
EXT. CONTROL: NORMAL
GLUCOSE SERPL-MCNC: 163 MG/DL (ref 65–140)
GLUCOSE SERPL-MCNC: 163 MG/DL (ref 65–140)

## 2022-09-23 PROCEDURE — C9290 INJ, BUPIVACAINE LIPOSOME: HCPCS | Performed by: ANESTHESIOLOGY

## 2022-09-23 PROCEDURE — C1713 ANCHOR/SCREW BN/BN,TIS/BN: HCPCS | Performed by: ORTHOPAEDIC SURGERY

## 2022-09-23 PROCEDURE — 29827 SHO ARTHRS SRG RT8TR CUF RPR: CPT | Performed by: ORTHOPAEDIC SURGERY

## 2022-09-23 PROCEDURE — 81025 URINE PREGNANCY TEST: CPT | Performed by: ANESTHESIOLOGY

## 2022-09-23 PROCEDURE — 82948 REAGENT STRIP/BLOOD GLUCOSE: CPT

## 2022-09-23 PROCEDURE — 29827 SHO ARTHRS SRG RT8TR CUF RPR: CPT | Performed by: PHYSICIAN ASSISTANT

## 2022-09-23 DEVICE — SELF-PUNCHING TRIPLE LOADED FIBERTAK
Type: IMPLANTABLE DEVICE | Site: SHOULDER | Status: FUNCTIONAL
Brand: ARTHREX®

## 2022-09-23 RX ORDER — DEXAMETHASONE SODIUM PHOSPHATE 10 MG/ML
INJECTION, SOLUTION INTRAMUSCULAR; INTRAVENOUS AS NEEDED
Status: DISCONTINUED | OUTPATIENT
Start: 2022-09-23 | End: 2022-09-23

## 2022-09-23 RX ORDER — OXYCODONE HYDROCHLORIDE 5 MG/1
TABLET ORAL
Qty: 13 TABLET | Refills: 0 | Status: SHIPPED | OUTPATIENT
Start: 2022-09-23 | End: 2022-10-21

## 2022-09-23 RX ORDER — ONDANSETRON 2 MG/ML
4 INJECTION INTRAMUSCULAR; INTRAVENOUS ONCE AS NEEDED
Status: DISCONTINUED | OUTPATIENT
Start: 2022-09-23 | End: 2022-09-23 | Stop reason: HOSPADM

## 2022-09-23 RX ORDER — MIDAZOLAM HYDROCHLORIDE 2 MG/2ML
INJECTION, SOLUTION INTRAMUSCULAR; INTRAVENOUS AS NEEDED
Status: DISCONTINUED | OUTPATIENT
Start: 2022-09-23 | End: 2022-09-23

## 2022-09-23 RX ORDER — SODIUM CHLORIDE, SODIUM LACTATE, POTASSIUM CHLORIDE, CALCIUM CHLORIDE 600; 310; 30; 20 MG/100ML; MG/100ML; MG/100ML; MG/100ML
50 INJECTION, SOLUTION INTRAVENOUS CONTINUOUS
Status: DISCONTINUED | OUTPATIENT
Start: 2022-09-23 | End: 2022-09-23 | Stop reason: HOSPADM

## 2022-09-23 RX ORDER — CEFAZOLIN SODIUM 2 G/50ML
2000 SOLUTION INTRAVENOUS ONCE
Status: DISCONTINUED | OUTPATIENT
Start: 2022-09-23 | End: 2022-09-23 | Stop reason: HOSPADM

## 2022-09-23 RX ORDER — CHLORHEXIDINE GLUCONATE 0.12 MG/ML
15 RINSE ORAL ONCE
Status: DISCONTINUED | OUTPATIENT
Start: 2022-09-23 | End: 2022-09-23 | Stop reason: HOSPADM

## 2022-09-23 RX ORDER — METOCLOPRAMIDE HYDROCHLORIDE 5 MG/ML
10 INJECTION INTRAMUSCULAR; INTRAVENOUS ONCE AS NEEDED
Status: DISCONTINUED | OUTPATIENT
Start: 2022-09-23 | End: 2022-09-23 | Stop reason: HOSPADM

## 2022-09-23 RX ORDER — ONDANSETRON 2 MG/ML
4 INJECTION INTRAMUSCULAR; INTRAVENOUS EVERY 6 HOURS PRN
Status: DISCONTINUED | OUTPATIENT
Start: 2022-09-23 | End: 2022-09-23 | Stop reason: HOSPADM

## 2022-09-23 RX ORDER — ACETAMINOPHEN 325 MG/1
650 TABLET ORAL EVERY 6 HOURS PRN
Status: DISCONTINUED | OUTPATIENT
Start: 2022-09-23 | End: 2022-09-23 | Stop reason: HOSPADM

## 2022-09-23 RX ORDER — FENTANYL CITRATE/PF 50 MCG/ML
50 SYRINGE (ML) INJECTION
Status: DISCONTINUED | OUTPATIENT
Start: 2022-09-23 | End: 2022-09-23 | Stop reason: HOSPADM

## 2022-09-23 RX ORDER — PROPOFOL 10 MG/ML
INJECTION, EMULSION INTRAVENOUS AS NEEDED
Status: DISCONTINUED | OUTPATIENT
Start: 2022-09-23 | End: 2022-09-23

## 2022-09-23 RX ORDER — OXYCODONE HYDROCHLORIDE 5 MG/1
5 TABLET ORAL EVERY 4 HOURS PRN
Status: DISCONTINUED | OUTPATIENT
Start: 2022-09-23 | End: 2022-09-23 | Stop reason: HOSPADM

## 2022-09-23 RX ORDER — BUPIVACAINE HYDROCHLORIDE 5 MG/ML
INJECTION, SOLUTION EPIDURAL; INTRACAUDAL AS NEEDED
Status: DISCONTINUED | OUTPATIENT
Start: 2022-09-23 | End: 2022-09-23

## 2022-09-23 RX ORDER — ONDANSETRON 2 MG/ML
INJECTION INTRAMUSCULAR; INTRAVENOUS AS NEEDED
Status: DISCONTINUED | OUTPATIENT
Start: 2022-09-23 | End: 2022-09-23

## 2022-09-23 RX ADMIN — MIDAZOLAM HYDROCHLORIDE 2 MG: 1 INJECTION, SOLUTION INTRAMUSCULAR; INTRAVENOUS at 07:12

## 2022-09-23 RX ADMIN — PROPOFOL 200 MG: 10 INJECTION, EMULSION INTRAVENOUS at 07:34

## 2022-09-23 RX ADMIN — LIDOCAINE HYDROCHLORIDE 60 MG: 20 INJECTION, SOLUTION INTRAVENOUS at 07:41

## 2022-09-23 RX ADMIN — SODIUM CHLORIDE, SODIUM LACTATE, POTASSIUM CHLORIDE, AND CALCIUM CHLORIDE: .6; .31; .03; .02 INJECTION, SOLUTION INTRAVENOUS at 07:32

## 2022-09-23 RX ADMIN — DEXAMETHASONE SODIUM PHOSPHATE 10 MG: 10 INJECTION, SOLUTION INTRAMUSCULAR; INTRAVENOUS at 07:39

## 2022-09-23 RX ADMIN — BUPIVACAINE HYDROCHLORIDE 7.5 ML: 5 INJECTION, SOLUTION EPIDURAL; INTRACAUDAL at 07:15

## 2022-09-23 RX ADMIN — BUPIVACAINE 20 ML: 13.3 INJECTION, SUSPENSION, LIPOSOMAL INFILTRATION at 07:15

## 2022-09-23 RX ADMIN — ONDANSETRON 4 MG: 2 INJECTION INTRAMUSCULAR; INTRAVENOUS at 07:39

## 2022-09-23 NOTE — ANESTHESIA PROCEDURE NOTES
Peripheral Block    Patient location during procedure: holding area  Start time: 9/23/2022 7:15 AM  Reason for block: at surgeon's request and post-op pain management  Staffing  Performed: Anesthesiologist   Anesthesiologist: Sergio Jordan MD  Preanesthetic Checklist  Completed: patient identified, IV checked, site marked, risks and benefits discussed, surgical consent, monitors and equipment checked, pre-op evaluation and timeout performed  Peripheral Block  Patient position: supine  Prep: ChloraPrep  Patient monitoring: continuous pulse ox and frequent blood pressure checks  Block type: interscalene  Laterality: right  Injection technique: single-shot  Procedures: ultrasound guided, Ultrasound guidance required for the procedure to increase accuracy and safety of medication placement and decrease risk of complications    Ultrasound permanent image saved  Needle  Needle type: Stimuplex   Needle gauge: 22 G  Needle length: 5 cm  Needle localization: ultrasound guidance  Test dose: negative  Assessment  Injection assessment: incremental injection and local visualized surrounding nerve on ultrasound  Paresthesia pain: none  Heart rate change: no  Slow fractionated injection: yes  Post-procedure:  site cleaned  patient tolerated the procedure well with no immediate complications

## 2022-09-23 NOTE — INTERVAL H&P NOTE
H&P reviewed  After examining the patient I find no changes in the patients condition since the H&P had been written      Vitals:    09/23/22 0651   BP: 137/77   Pulse: 86   Resp: 16   Temp: 97 7 °F (36 5 °C)   SpO2: 100%

## 2022-09-23 NOTE — ANESTHESIA POSTPROCEDURE EVALUATION
Post-Op Assessment Note    CV Status:  Stable    Pain management: adequate     Mental Status:  Alert and awake   Hydration Status:  Euvolemic   PONV Controlled:  Controlled   Airway Patency:  Patent      Post Op Vitals Reviewed: Yes      Staff: CRNA, Anesthesiologist         No complications documented      BP   112/78   Temp   98   Pulse  67   Resp   13   SpO2   100

## 2022-09-23 NOTE — DISCHARGE INSTRUCTIONS
You are being scheduled for a shoulder arthroscopy to treat your symptoms  Below are some instructions and information on what to expect before and after your surgery  Pre-Surgical Preparation for Arthroscopic Shoulder Surgery: You will be contacted the evening prior to your surgery to confirm the scheduled time of the procedure and when to arrive at the hospital    Do not eat or drink anything after midnight the night before your surgery  Since you are having out-patient surgery, make sure that you have someone who can drive you home later in the day  Also, prepare that person for a long day, as the process of safely preparing for and recovering from the procedure is more time consuming than the actual procedure! As you will be in a sling after surgery, please wear or bring a loose fitting button-down shirt so that you can easily place this over the sling when you leave the surgical suite  This avoids having to place the operative arm in a sleeve  Most patients find that this is the easiest outfit to wear for the first week or so after surgery so you may want to plan accordingly  Most patients find that lying down in bed after shoulder surgery accentuates their discomfort  This is likely related to the effect of gravity on the swelling in the shoulder  As a result, most patients sleep better in a recliner or in bed with pillows propped up behind their back for the first few days or weeks after surgery  It is a good idea to plan for this ahead of time so there will be less hassle getting things set up the night after surgery  What to Expect After Arthroscopic Shoulder Surgery: It is normal to have swelling and discomfort in the shoulder for several days or a week after surgery  It is also normal to have a small amount of drainage from the surgical wounds (especially the first few days after surgery), as we put fluid into the shoulder to visualize the structures during surgery  It is NOT normal to have foul smelling, purulent drainage and if this is noted, please contact the office immediately or proceed to the emergency room for evaluation as this may indicate an infection  Applying ice bags to the shoulder may help with pain that is not controlled by the regional block  Ice should be applied 20-30 minutes at a time, every hour or two  Make sure to put a thin towel or T-shirt next to your skin to avoid direct contact of the ice with the skin  Icing is most helpful in the first 48 hours, although many people find that continuing past this time frame lessens their postoperative pain  Please note that your post-operative dressing is not conductive to ice, so if you need to, it is okay to remove that dressing even the night after surgery and place band-aids over the wounds in order for the ice to take effect  Pain Control    Most patients will receive a nerve block, the local anesthetic may keep your whole arm numb for up to 4 days  You will be given a prescription for narcotic pain medication when you are discharged from the hospital   With the newer nerve block that is being utilized, patients are rarely requiring the use of this narcotic pain medication  If you find you do not tolerate that type of pain medicine well, call our office and we will try another one  In addition to the narcotic pain medication, it is safe to use an anti-inflammatory (unless the patient has a medical condition that would not allow safe use of this mediation)  This includes the Advil, Motrin, Ibuprofen and Alleve category of medications  Simply follow the over the counter dosing on the package and use as indicated as another adjunct  Importantly since these medications are all very similar, use only one of them  Tylenol is a separate medication that can be utilized as well and can be taken at the same time as the other medication or given in a "staggered" manner    Just make sure that you follow the dosing on the over the counter bottle instructions  Also make sure that the pain medication prescribed by Dr Ruby Murray team does not contain acetaminophen (this is found in Percocet and Vicodin)  Typically we do not prescribe those types of pain medications but if for some reason that has been prescribed DO NOT add more Tylenol (acetaminophen) as you could end up taking too much of that medication  As mentioned above, most patients find that lying down accentuates their discomfort  You might sleep better in a recliner, or propped up in bed  Dr Isidoro Nissen encourages patients to safely ambulate around the house as much as possible in the first few days after the procedure as this can help with blood circulation in the legs  While the incidence of blood clots is very rare following shoulder surgery, early ambulation is a great way to help decrease the already low rate  24 hours after the surgery you may remove the bandage and cover incisions with Band-Aids if needed  At that time you may shower, the wounds will have a surgical glue that will protect them from shower water but do not submerge your incisions directly (bathing or swimming) until at least 2 weeks post-operatively  It is safe to let the arm hang at the side and take a shower and put on a shirt without the sling on  Just make sure that you do not use the operative are to reach out and grab anything as that may damage the repair  When you are done showering and getting dressed please return the operative arm to the sling  Unless noted otherwise in your discharge paperwork, Dr Isidoro Nissen uses absorbable sutures so they do not need to be removed  Dr Sanjuanita Stringer physician assistant (PA) will see you in the office a few days after the procedure to review the intra-operative findings and to initiate physical therapy if appropriate    A post-operative appointment should have been scheduled for you already, but if for some reason this did not happen, please call the office to make one  Physical therapy is important after nearly all shoulder surgeries and a detailed rehabilitation plan based on the specific intra-operative findings and procedures will be provided to your therapist at the first post-operative office visit  Most patients have post-operative therapy appointments scheduled pre-operatively, but if you do not, that will be handled at the first post-operative office visit  Unless expressly directed otherwise it is safe to remove the sling even the first day after the surgery and let the arm hang by the side  This allows patients to shower and even put a shirt on (bad arm in the sleeve first)  It is also safe to flex and extend their wrist, hand and fingers as much as possible when the block wears off  These simple motions can serve to pump fluid out of the forearm and decrease swelling in the arm            Error below    HPI  Review of Systems  Ortho Exam  Physical Exam

## 2022-09-23 NOTE — OP NOTE
OPERATIVE REPORT  PATIENT NAME: Bennie Beal    :  1965  MRN: [id number]  Pt Location: [facility] OR ROOM 06    SURGERY DATE: 2022     SURGEON: Noble Lanza MD     ASSISTANT: Barney Sanchez PA-C     NOTE: Barney Sanchez PA-C was present throughout the entire procedure and performed essential assistance with patient prepping, draping, positioning, suture management, wound closure, sterile dressing application and sling application, all under my direct supervision  NOTE: No qualified resident physician was available for assistance    PREOPERATIVE DIAGNOSIS:  Right Shoulder Infraspinatus Tear    POSTOPERATIVE DIAGNOSIS: Right Shoulder Supraspinatus Tear    PROCEDURES: Surgical Arthroscopy Right Shoulder with Rotator Cuff Repair    ANESTHESIA STAFF: Chiqui Welch MD     ANESTHESIA TYPE: General LMA with ultrasound guided interscalene block (Exparel)  The interscalene block was provided by the anesthesia staff per my request for postoperative pain control and to decrease the use of postoperative narcotic medication for pain control  COMPLICATIONS: None    FINDINGS: Supraspinatus Tear    SPECIMEN(S):  None    ESTIMATED BLOOD LOSS: Minimal    INDICATION:  Briefly, the patient is a 62 y o   female with right shoulder pain  MRI scan confirmed a high grade articular sided partial thickness infraspinatus tear  The patient failed to improve with nonoperative care so she elected for an examination under anesthesia to confirm no adhesive capsulitis and then arthroscopic treatment of her partial rotator cuff tear  Informed consent was obtained after a thorough discussion of the risks and benefits of the procedure, as well as alternatives to the procedure  OPERATIVE TECHNIQUE:  On the day of surgery, I identified the patients right shoulder and marked it with my initials  The patient was taken to the operating room where anesthesia was induced and 2 grams of IV Cefazolin were given   The patient was examined in the supine position and was found to have full range of motion of the right shoulder with no instability   The patient was then positioned in the [address] [place] [address] chair position  All bony prominences were padded  The shoulder was prepped and draped in normal sterile fashion  After a time-out for safety, a standard posterior arthroscopic portal was made  Glenohumeral evaluation revealed intact glenohumeral articular cartilage with no loose bodies  There was a high-grade, near full-thickness articular sided partial thickness supraspinatus tear  The infraspinatus which was reported to be partially torn was intact and was felt arm position at the time of MRI was the reason for this intraoperative discrepancy but clearly the near full-thickness supraspinatus tendon tear was true pathology requiring treatment  The long-head biceps and subscapularis were intact as was the circumferential glenoid labrum  The near full-thickness supraspinatus tendon tear was tagged with a PDS stitch for evaluation the subacromial space  After the intra-articular evaluation was completed, the scope was then placed in the subacromial space through a posterolateral portal where a thorough bursectomy was performed  The PDS stitch was identified and this was confirmed to be a true near full-thickness tear with only some very thin translucent bursal sided fibers remaining  These fibers were released converting this to a full-thickness tear to allow for stable repair  After conversion to full-thickness supraspinatus tear this was found to measure 1 0 cm from anterior to posterior and was able to be easily reduced to the tuberosity  The tuberosity was prepared in routine fashion and a single row repair with a single, laterally placed Arthrex 2 6 mm triple loaded All-Suture anchor using three stiches through the tendon was performed achieving anatomic reduction of the rotator cuff tendon to the tuberosity   The long head of biceps was not indicated for tenodesis given intra-operative appearance  The CA ligament was frayed and was debrided to stable border  The area was then irrigated  Scope was withdrawn  Wounds were closed with 4-0 Monocryl and Histoacryl  Sterile dressings and a sling with an abduction pillow was placed  The patient was awoken without complication and returned to the recovery room in good condition  We will see the patient back in the office next week to initiate therapy following accelerated rotator cuff repair rehabilitation (tear size 1 0 cm or less) protocol  At the end of procedure, the counts were correct       PATIENT DISPOSITION:  Stable to PACU      SIGNATURE: Margret Salgado MD  DATE: September 23, 2022  TIME: 8:28 AM

## 2022-09-26 ENCOUNTER — OFFICE VISIT (OUTPATIENT)
Dept: OBGYN CLINIC | Facility: OTHER | Age: 57
End: 2022-09-26

## 2022-09-26 VITALS
HEART RATE: 90 BPM | HEIGHT: 64 IN | DIASTOLIC BLOOD PRESSURE: 81 MMHG | WEIGHT: 156 LBS | SYSTOLIC BLOOD PRESSURE: 132 MMHG | BODY MASS INDEX: 26.63 KG/M2

## 2022-09-26 DIAGNOSIS — Z47.89 AFTERCARE FOLLOWING SURGERY OF THE MUSCULOSKELETAL SYSTEM: Primary | ICD-10-CM

## 2022-09-26 PROCEDURE — 99024 POSTOP FOLLOW-UP VISIT: CPT | Performed by: PHYSICIAN ASSISTANT

## 2022-09-26 NOTE — LETTER
September 26, 2022     Patient: Tomás Walsh  YOB: 1965  Date of Visit: 9/26/2022      To Whom it May Concern:    Tomás Walsh is under my professional care  She had right shoulder [place] was seen in my office on 9/26/2022  Fabrizio Lyon may return to work on 10/17/2022 and may return to work with limitations Left hand work only  No lifting, pulling, or pushing right arm  Desk work/computer work is okay  If these accommodations cannot be met then she is to remain out of work       If you have any questions or concerns, please don't hesitate to call           Sincerely,          Merline Norman PA-C        CC: No Recipients

## 2022-09-26 NOTE — PROGRESS NOTES
Assessment:       1  Aftercare following surgery of the musculoskeletal system          Plan:        Patient is doing well postoperatively  Operative note, images, and accelerated rotator cuff repair rehab protocol were discussed  All questions were addressed to the patient's satisfaction  Right right hand weakness and paresthesias seems consistent with anesthetic block  Patient has an appointment with PT  A work note has been placed in her chart  Follow-up will be in 6 weeks to assess patient's progress  Subjective:     Patient ID: Aly Castaneda is a 62 y o  female  Chief Complaint:    HPI    The patient presents to the office for follow-up status post right shoulder arthroscopy on 09/23/2022  She reports her pain is controlled  She does have some residual paresthesia following anesthetic block in her right hand  Social History     Occupational History    Not on file   Tobacco Use    Smoking status: Never Smoker    Smokeless tobacco: Never Used   Vaping Use    Vaping Use: Never used   Substance and Sexual Activity    Alcohol use: Never    Drug use: Never    Sexual activity: Not on file      Review of Systems   Constitutional: Negative  Respiratory: Negative  Cardiovascular: Negative  Gastrointestinal: Negative  Genitourinary: Negative  Musculoskeletal: Positive for myalgias  Negative for arthralgias  Skin: Positive for wound  Neurological: Positive for weakness and numbness  Hematological: Negative  Psychiatric/Behavioral: Negative  Objective:         Neurological Testing     Additional Neurological Details  Diffuse decrease in sensation and weakness in right hand  Physical Exam  HENT:      Head: Atraumatic  Cardiovascular:      Pulses: Normal pulses  Pulmonary:      Effort: Pulmonary effort is normal    Musculoskeletal:      Comments: Right shoulder range of motion not tested  Skin:     General: Skin is warm and dry        Capillary Refill: Capillary refill takes less than 2 seconds  Neurological:      Mental Status: She is alert and oriented to person, place, and time  Sensory: Sensory deficit present  Comments: Diffuse decrease in sensation and weakness in right hand     Psychiatric:         Mood and Affect: Mood normal          Behavior: Behavior normal

## 2022-09-27 ENCOUNTER — OFFICE VISIT (OUTPATIENT)
Dept: PHYSICAL THERAPY | Facility: REHABILITATION | Age: 57
End: 2022-09-27
Payer: COMMERCIAL

## 2022-09-27 ENCOUNTER — TELEPHONE (OUTPATIENT)
Dept: OBGYN CLINIC | Facility: HOSPITAL | Age: 57
End: 2022-09-27

## 2022-09-27 DIAGNOSIS — M75.111 NONTRAUMATIC INCOMPLETE TEAR OF RIGHT ROTATOR CUFF: ICD-10-CM

## 2022-09-27 PROCEDURE — 97162 PT EVAL MOD COMPLEX 30 MIN: CPT | Performed by: PHYSICAL THERAPIST

## 2022-09-27 PROCEDURE — 97110 THERAPEUTIC EXERCISES: CPT | Performed by: PHYSICAL THERAPIST

## 2022-09-27 NOTE — TELEPHONE ENCOUNTER
RE:  Return to work letter  #  [phone]    RTW letter must state date of light duty (ex  Light duty until 11/7/2022)      Please fax back to MAURICE RUDD [facility] [place] [employer]    Thank you

## 2022-09-27 NOTE — PROGRESS NOTES
PT Evaluation     Today's date: 2022  Patient name: Leonie Hobbs  : 1965  MRN: [id number]  Referring provider: Mandie Brown  Dx:   Encounter Diagnosis     ICD-10-CM    1  Nontraumatic incomplete tear of supraspinatus tendon, right  M75 111 Ambulatory Referral to Physical Therapy       Start Time: 1710  Stop Time: 1750  Total time in clinic (min): 40 minutes    Assessment  Assessment details: 63 y/o female presents s/p right RTC repair on 22  She was d/c'ed home the same day with a sling  She has not taken any prescription pain medication, but has been using OTC medication  She is concerned because she is still experiencing N/T in the right arm and digits 1-3 as well as most of the arm  Also, besides her hand, she can not actively control her right UE      Cervical screening = wnl       Impairments: abnormal muscle tone, abnormal or restricted ROM, impaired physical strength, lacks appropriate home exercise program, pain with function and poor posture     Symptom irritability: highUnderstanding of Dx/Px/POC: good   Prognosis: good    Goals  ST - I with HEP  2 - don/doff sling I  LT - FOTO > 72  2 - dress self without limitation  3 - use right arm to turn a door knob  4 - RTW     Plan  Patient would benefit from: skilled speech therapy  Planned therapy interventions: manual therapy, activity modification, joint mobilization, neuromuscular re-education, patient education, home exercise program, postural training, strengthening, stretching, therapeutic activities and therapeutic exercise  Frequency: 2x week  Duration in weeks: 12  Treatment plan discussed with: patient        Subjective Evaluation    History of Present Illness  Date of surgery: 2022  Mechanism of injury: surgery  Quality of life: good    Pain  At best pain ratin  At worst pain ratin  Quality: sharp, pulling, discomfort and knife-like  Relieving factors: support, change in position and ice  Aggravating factors: lifting    Social Support  Lives in: multiple-level home    Hand dominance: right      Diagnostic Tests  MRI studies: abnormal  Treatments  Previous treatment: physical therapy  Current treatment: immobilization  Patient Goals  Patient goals for therapy: increased strength, independence with ADLs/IADLs, return to sport/leisure activities, increased motion and decreased pain          Objective     Observations     Right Shoulder  Positive for incision       Neurological Testing     Sensation     Shoulder     Right Shoulder   Intact: light touch  Diminished: light touch    Passive Range of Motion     Right Shoulder   Flexion: 40 degrees with pain  Abduction: 40 degrees with pain  External rotation 0°: 5 degrees with pain    General Comments:      Shoulder Comments   Sling: requires assistance to don/doff    FOTO = 28    Incision:  No drainage    scap retraction = fair    UE control:  Hand opening = able to open, but cautious  Elbow flexion = unable to actively control    Post-op activity restrictions: pt not aware of the post-op activity restrictions                 Precautions: right shoulder surgery 09/22/22    Manuals 09/27                                                                Neuro Re-Ed 09/27                                                                                                       Ther Ex 09/27            Supine R shoulder PROM LMR            Hand open/close 2x15            Seated scap retraction x9            HEP LMR            Pt education - activity modification LMR                                                   Ther Activity                                       Gait Training                                       Modalities

## 2022-09-29 ENCOUNTER — OFFICE VISIT (OUTPATIENT)
Dept: PHYSICAL THERAPY | Facility: REHABILITATION | Age: 57
End: 2022-09-29
Payer: COMMERCIAL

## 2022-09-29 DIAGNOSIS — M75.111 NONTRAUMATIC INCOMPLETE TEAR OF RIGHT ROTATOR CUFF: Primary | ICD-10-CM

## 2022-09-29 PROCEDURE — 97140 MANUAL THERAPY 1/> REGIONS: CPT | Performed by: PHYSICAL THERAPIST

## 2022-09-29 PROCEDURE — 97110 THERAPEUTIC EXERCISES: CPT | Performed by: PHYSICAL THERAPIST

## 2022-09-29 NOTE — PROGRESS NOTES
Daily Note     Today's date: 2022  Patient name: Carissa Olivier  : 1965  MRN: [id number]  Referring provider: Deidra Calle  Dx:   Encounter Diagnosis     ICD-10-CM    1  Nontraumatic incomplete tear of supraspinatus tendon, right  M75 111        Start Time: 1015  Stop Time: 1045  Total time in clinic (min): 30 minutes    Subjective:   Pt states the feeling in her hand and arm is coming back  Her hand feels much better  She was able to use her right hand to hold the toothpaste today  She is still struggling to sleep  Objective: See treatment diary below  HEP updated with right elbow flexion/extension arom (with arm supported)  Assessment: Tolerated treatment fair  Patient would benefit from continued PT    Plan: Progress treament per protocol        Precautions: right shoulder surgery 22    Manuals            Tissue deformation - forearm flexors  LMR           Tissue deformation - distal biceps  LMR                                     Neuro Re-Ed                                                                                                       Ther Ex            Supine R shoulder PROM LMR LMR           Hand open/close 2x15 15           Seated scap retraction x9 x5           HEP LMR LMR           Pt education - activity modification LMR            Elbow flex/ext arom  15                        Pt education - don/doff sling  LMR           Ther Activity                                       Gait Training                                       Modalities

## 2022-10-03 ENCOUNTER — OFFICE VISIT (OUTPATIENT)
Dept: PHYSICAL THERAPY | Facility: REHABILITATION | Age: 57
End: 2022-10-03
Payer: COMMERCIAL

## 2022-10-03 DIAGNOSIS — M75.111 NONTRAUMATIC INCOMPLETE TEAR OF RIGHT ROTATOR CUFF: Primary | ICD-10-CM

## 2022-10-03 PROCEDURE — 97110 THERAPEUTIC EXERCISES: CPT | Performed by: PHYSICAL THERAPIST

## 2022-10-03 NOTE — PROGRESS NOTES
Daily Note     Today's date: 10/3/2022  Patient name: Harris Huitron  : 1965  MRN: [id number]  Referring provider: Hortencia Romero  Dx:   Encounter Diagnosis     ICD-10-CM    1  Nontraumatic incomplete tear of supraspinatus tendon, right  M75 111        Start Time: 1245  Stop Time: 1515  Total time in clinic (min): 30 minutes    Subjective: Pt states her arm feels much better now that she has her feeling back  She has been icing a lot and taking Tylenol  Objective: See treatment diary below    Assessment: Tolerated treatment well  Patient would benefit from continued PT    Plan: Continue per plan of care        Precautions: right shoulder surgery 22    Manuals 09/27 09/29 10/03          Tissue deformation - forearm flexors  LMR           Tissue deformation - distal biceps  LMR                                     Neuro Re-Ed 09/27 09/29 10/03                                                                                                     Ther Ex 09/27 09/29 10/03          Supine R shoulder PROM LMR LMR LMR          Hand open/close 2x15 15           Seated scap retraction x9 x5 Stand - 3x5          HEP LMR LMR           Pt education - activity modification LMR            Elbow flex/ext arom  15           p-ball rollouts   25          Pt education - don/doff sling  LMR           Ther Activity                                       Gait Training                                       Modalities

## 2022-10-06 ENCOUNTER — OFFICE VISIT (OUTPATIENT)
Dept: PHYSICAL THERAPY | Facility: REHABILITATION | Age: 57
End: 2022-10-06
Payer: COMMERCIAL

## 2022-10-06 DIAGNOSIS — M75.111 NONTRAUMATIC INCOMPLETE TEAR OF RIGHT ROTATOR CUFF: Primary | ICD-10-CM

## 2022-10-06 PROCEDURE — 97110 THERAPEUTIC EXERCISES: CPT | Performed by: PHYSICAL THERAPIST

## 2022-10-06 NOTE — PROGRESS NOTES
Daily Note     Today's date: 10/6/2022  Patient name: Abad Codnon  : 1965  MRN: [id number]  Referring provider: Xiomara Rose  Dx:   Encounter Diagnosis     ICD-10-CM    1  Nontraumatic incomplete tear of supraspinatus tendon, right  M75 111        Start Time: 1410  Stop Time: 1440  Total time in clinic (min): 30 minutes    Subjective: Pt states she is feeling much better overall, but she consistently experiences pain around 2-3 am      Objective: See treatment diary below    Assessment: Tolerated treatment well  Patient would benefit from continued PT    Plan: Progress treament per protocol        Precautions: right shoulder surgery 22    Manuals 09/27 09/29 10/03 10/06         Tissue deformation - forearm flexors  LMR           Tissue deformation - distal biceps  LMR                                     Neuro Re-Ed 09/27 09/29 10/03 10/06                                                                                                    Ther Ex 09/27 09/29 10/03 10/06         Supine R shoulder PROM LMR LMR LMR LMR         Hand open/close 2x15 15           Seated scap retraction x9 x5 Stand - 3x5 x3         HEP LMR LMR           Pt education - activity modification LMR            Elbow flex/ext arom  15           p-ball rollouts   25 3'         Pt education - don/doff sling  LMR           Ther Activity                                       Gait Training                                       Modalities

## 2022-10-10 ENCOUNTER — OFFICE VISIT (OUTPATIENT)
Dept: PHYSICAL THERAPY | Facility: REHABILITATION | Age: 57
End: 2022-10-10
Payer: COMMERCIAL

## 2022-10-10 DIAGNOSIS — M75.111 NONTRAUMATIC INCOMPLETE TEAR OF RIGHT ROTATOR CUFF: Primary | ICD-10-CM

## 2022-10-10 PROCEDURE — 97110 THERAPEUTIC EXERCISES: CPT | Performed by: PHYSICAL THERAPIST

## 2022-10-10 PROCEDURE — 97140 MANUAL THERAPY 1/> REGIONS: CPT | Performed by: PHYSICAL THERAPIST

## 2022-10-10 NOTE — PROGRESS NOTES
Daily Note     Today's date: 10/10/2022  Patient name: Leticia Rodriguez  : 1965  MRN: [id number]  Referring provider: Tracie Palencia  Dx:   Encounter Diagnosis     ICD-10-CM    1  Nontraumatic incomplete tear of supraspinatus tendon, right  M75 111        Start Time: 1215  Stop Time: 1240  Total time in clinic (min): 25 minutes    Subjective:   Pt states she is in constant pain  Today, her shoulder pain is a 9/10  She states she has been trying to actively lift her arm  Objective: See treatment diary below  Pt educated, at length, again, on her post-op activity restrictions and how it effects/delays healing  Manual treatment limited by guarding today  Assessment: Tolerated treatment fair  Patient would benefit from continued PT    Plan: Progress treament per protocol        Precautions: right shoulder surgery 22    Manuals 09/27 09/29 10/03 10/06 10/10        Tissue deformation - forearm flexors  LMR           Tissue deformation - distal biceps  LMR           Gr 1 oscillations - supine     LMR - extensive        Gr 1 oscillations - standing     LMR        Neuro Re-Ed 09/27 09/29 10/03 10/06 10/10                                                                                                   Ther Ex 09/27 09/29 10/03 10/06 10/10        Supine R shoulder PROM LMR LMR LMR LMR supine and standing - LMR        Hand open/close 2x15 15           Seated scap retraction x9 x5 Stand - 3x5 x3         HEP LMR LMR           Pt education - activity modification LMR    LMR        Elbow flex/ext arom  15           p-ball rollouts   25 3'         Pt education - don/doff sling  LMR           Ther Activity                                       Gait Training                                       Modalities

## 2022-10-11 PROBLEM — J01.01 ACUTE RECURRENT MAXILLARY SINUSITIS: Status: RESOLVED | Noted: 2022-05-06 | Resolved: 2022-10-11

## 2022-10-13 ENCOUNTER — OFFICE VISIT (OUTPATIENT)
Dept: PHYSICAL THERAPY | Facility: REHABILITATION | Age: 57
End: 2022-10-13
Payer: COMMERCIAL

## 2022-10-13 DIAGNOSIS — M75.111 NONTRAUMATIC INCOMPLETE TEAR OF RIGHT ROTATOR CUFF: Primary | ICD-10-CM

## 2022-10-13 PROCEDURE — 97110 THERAPEUTIC EXERCISES: CPT | Performed by: PHYSICAL THERAPIST

## 2022-10-13 PROCEDURE — 97140 MANUAL THERAPY 1/> REGIONS: CPT | Performed by: PHYSICAL THERAPIST

## 2022-10-13 NOTE — PROGRESS NOTES
Daily Note     Today's date: 10/13/2022  Patient name: Hyun Esquivel  : 1965  MRN: [id number]  Referring provider: Mojgan Marshall  Dx:   Encounter Diagnosis     ICD-10-CM    1  Nontraumatic incomplete tear of supraspinatus tendon, right  M75 111        Start Time: 1400  Stop Time: 1435  Total time in clinic (min): 35 minutes    Subjective: Pt has not iced in two days  She took her sling apart to clean it and doesn't feel like it fits as good now  Objective: See treatment diary below  Sling adjusted for proper fit  Assessment: Tolerated treatment well  Patient would benefit from continued PT    Plan: Continue per plan of care        Precautions: right shoulder surgery 22    Manuals 09/27 09/29 10/03 10/06 10/10 10/13       Tissue deformation - forearm flexors  LMR           Tissue deformation - distal biceps  LMR           Gr 1 oscillations - supine     LMR - extensive LMR       Gr 1 oscillations - standing     LMR        Neuro Re-Ed 09/27 09/29 10/03 10/06 10/10 10/13                                                                                                  Ther Ex 09/27 09/29 10/03 10/06 10/10 10/13       Supine R shoulder PROM LMR LMR LMR LMR supine and standing - LMR supine - LMR       Hand open/close 2x15 15           Seated scap retraction x9 x5 Stand - 3x5 x3  prone - 2x13       HEP LMR LMR    LMR       Pt education - activity modification LMR    LMR        Prone arm hangs      3'       Elbow flex/ext arom  15           p-ball rollouts   25 3'         Pt education - don/doff sling  LMR           Ther Activity                                       Gait Training                                       Modalities

## 2022-10-17 ENCOUNTER — OFFICE VISIT (OUTPATIENT)
Dept: PHYSICAL THERAPY | Facility: REHABILITATION | Age: 57
End: 2022-10-17
Payer: COMMERCIAL

## 2022-10-17 DIAGNOSIS — M75.111 NONTRAUMATIC INCOMPLETE TEAR OF RIGHT ROTATOR CUFF: Primary | ICD-10-CM

## 2022-10-17 PROCEDURE — 97140 MANUAL THERAPY 1/> REGIONS: CPT | Performed by: PHYSICAL THERAPIST

## 2022-10-17 PROCEDURE — 97110 THERAPEUTIC EXERCISES: CPT | Performed by: PHYSICAL THERAPIST

## 2022-10-17 NOTE — PROGRESS NOTES
Daily Note     Today's date: 10/17/2022  Patient name: Francine Almonte  : 1965  MRN: [id number]  Referring provider: Dena Guzmán  Dx:   Encounter Diagnosis     ICD-10-CM    1  Nontraumatic incomplete tear of supraspinatus tendon, right  M75 111        Start Time: 1215  Stop Time: 1240  Total time in clinic (min): 25 minutes    Subjective: "When does it stop hurting?"  Pt did not take any Tylenol today  She has been putting Neosporin on the incision  Objective: See treatment diary below  Consistent VC's needed during PROM due to muscle guarding  Incision:  Moderate scar tissue formation  Assessment: Tolerated treatment fair  Patient would benefit from continued PT    Plan: Progress treament per protocol        Precautions: right shoulder surgery 22    Manuals 09/27 09/29 10/03 10/06 10/10 10/13 10/17      Tissue deformation - forearm flexors  LMR           Tissue deformation - distal biceps  LMR           Gr 1 oscillations - supine     LMR - extensive LMR LMR      Gr 1 oscillations - standing     LMR  S/l - LMR      Neuro Re-Ed 09/27 09/29 10/03 10/06 10/10 10/13 10/17                                                                                                 Ther Ex 09/27 09/29 10/03 10/06 10/10 10/13 10/17      Supine R shoulder PROM LMR LMR LMR LMR supine and standing - LMR supine - LMR supine and s/l - LMR      Hand open/close 2x15 15           Seated scap retraction x9 x5 Stand - 3x5 x3  prone - 2x13       HEP LMR LMR    LMR       Pt education - activity modification LMR    LMR        Prone arm hangs      3'       Elbow flex/ext arom  15           p-ball rollouts   25 3'         Pt education - don/doff sling  LMR           Ther Activity                                       Gait Training                                       Modalities

## 2022-10-20 ENCOUNTER — OFFICE VISIT (OUTPATIENT)
Dept: PHYSICAL THERAPY | Facility: REHABILITATION | Age: 57
End: 2022-10-20
Payer: COMMERCIAL

## 2022-10-20 DIAGNOSIS — M75.111 NONTRAUMATIC INCOMPLETE TEAR OF RIGHT ROTATOR CUFF: Primary | ICD-10-CM

## 2022-10-20 PROCEDURE — 97110 THERAPEUTIC EXERCISES: CPT | Performed by: PHYSICAL THERAPIST

## 2022-10-20 PROCEDURE — 97140 MANUAL THERAPY 1/> REGIONS: CPT | Performed by: PHYSICAL THERAPIST

## 2022-10-20 NOTE — PROGRESS NOTES
Daily Note     Today's date: 10/20/2022  Patient name: Magdiel Alves  : 1965  MRN: [id number]  Referring provider: Aisha Zelaya  Dx:   Encounter Diagnosis     ICD-10-CM    1  Nontraumatic incomplete tear of supraspinatus tendon, right  M75 111        Start Time: 1145  Stop Time: 1215  Total time in clinic (min): 30 minutes    Subjective: Pt had the sling off for most of the day yesterday  Objective: See treatment diary below  PROM performed in standing  Pt had an easier time relaxing in standing versus supine  Assessment: Tolerated treatment well  Patient would benefit from continued PT    Plan: Continue per plan of care        Precautions: right shoulder surgery 22    Manuals 09/27 09/29 10/03 10/06 10/10 10/13 10/17 10/20     Tissue deformation - forearm flexors  LMR           Tissue deformation - distal biceps  LMR           Scar tissue massage - incision        LMR     Gr 1 oscillations - supine     LMR - extensive LMR LMR      Gr 1 oscillations - standing     LMR  S/l - LMR LMR     Neuro Re-Ed 09/27 09/29 10/03 10/06 10/10 10/13 10/17 10/20                                                                                                Ther Ex 09/27 09/29 10/03 10/06 10/10 10/13 10/17 10/20     Supine R shoulder PROM LMR LMR LMR LMR supine and standing - LMR supine - LMR supine and s/l - LMR stand - LMR     Hand open/close 2x15 15           Seated scap retraction x9 x5 Stand - 3x5 x3  prone - 2x13  seated - 15     Walking - arm at side        300 ft                  HEP LMR LMR    LMR  LMR     Pt education - activity modification LMR    LMR        Prone arm hangs      3'       Elbow flex/ext arom  15      25     p-ball rollouts   25 3'    nv     Pt education - don/doff sling  LMR           Ther Activity                                       Gait Training                                       Modalities

## 2022-10-21 ENCOUNTER — OFFICE VISIT (OUTPATIENT)
Dept: FAMILY MEDICINE CLINIC | Facility: CLINIC | Age: 57
End: 2022-10-21
Payer: COMMERCIAL

## 2022-10-21 VITALS
TEMPERATURE: 97.1 F | HEART RATE: 97 BPM | DIASTOLIC BLOOD PRESSURE: 84 MMHG | OXYGEN SATURATION: 97 % | SYSTOLIC BLOOD PRESSURE: 128 MMHG

## 2022-10-21 DIAGNOSIS — E11.9 TYPE 2 DIABETES MELLITUS WITHOUT COMPLICATION, WITHOUT LONG-TERM CURRENT USE OF INSULIN (HCC): Primary | ICD-10-CM

## 2022-10-21 DIAGNOSIS — M75.111 NONTRAUMATIC INCOMPLETE TEAR OF RIGHT ROTATOR CUFF: ICD-10-CM

## 2022-10-21 DIAGNOSIS — Z23 NEED FOR INFLUENZA VACCINATION: ICD-10-CM

## 2022-10-21 DIAGNOSIS — K90.0 TRANSIENT GLUTEN SENSITIVITY: ICD-10-CM

## 2022-10-21 DIAGNOSIS — E78.2 MIXED HYPERLIPIDEMIA: ICD-10-CM

## 2022-10-21 DIAGNOSIS — Z12.31 ENCOUNTER FOR SCREENING MAMMOGRAM FOR MALIGNANT NEOPLASM OF BREAST: ICD-10-CM

## 2022-10-21 LAB
CREAT UR-MCNC: 140 MG/DL
MICROALBUMIN UR-MCNC: 12.6 MG/L (ref 0–20)
MICROALBUMIN/CREAT 24H UR: 9 MG/G CREATININE (ref 0–30)
SL AMB POCT HEMOGLOBIN AIC: 7.4 (ref ?–6.5)

## 2022-10-21 PROCEDURE — 83036 HEMOGLOBIN GLYCOSYLATED A1C: CPT

## 2022-10-21 PROCEDURE — 90471 IMMUNIZATION ADMIN: CPT

## 2022-10-21 PROCEDURE — 99214 OFFICE O/P EST MOD 30 MIN: CPT

## 2022-10-21 PROCEDURE — 82570 ASSAY OF URINE CREATININE: CPT | Performed by: NURSE PRACTITIONER

## 2022-10-21 PROCEDURE — 90682 RIV4 VACC RECOMBINANT DNA IM: CPT

## 2022-10-21 PROCEDURE — 82043 UR ALBUMIN QUANTITATIVE: CPT | Performed by: NURSE PRACTITIONER

## 2022-10-21 RX ORDER — NABUMETONE 750 MG/1
750 TABLET, FILM COATED ORAL 2 TIMES DAILY PRN
Qty: 60 TABLET | Refills: 0 | Status: SHIPPED | OUTPATIENT
Start: 2022-10-21

## 2022-10-21 NOTE — ASSESSMENT & PLAN NOTE
Celiac disease profile was ordered to be collected prior to next office visit  Patient was advised to continue to avoid foods containing gluten

## 2022-10-21 NOTE — ASSESSMENT & PLAN NOTE
Lab Results   Component Value Date    HGBA1C 7 4 (A) 10/21/2022     Patient's hemoglobin A1c did worsen however, due to the circumstances surrounding her recent surgery I feel this is a transient raise  Patient will be maintained on current dosage of metformin and I did reinforce the importance of limiting sugar and carbohydrates in her diet  Urine microalbumin was collected in the office today  Hemoglobin A1c will be reassessed in 6 months

## 2022-10-21 NOTE — PROGRESS NOTES
Name: Gary Mclain      : 1965      MRN: [id number]  Encounter Provider: NITESH Cleveland  Encounter Date: 10/21/2022   Encounter department: [place] [address]     1  Type 2 diabetes mellitus without complication, without long-term current use of insulin ([facility])  Assessment & Plan:    Lab Results   Component Value Date    HGBA1C 7 4 (A) 10/21/2022     Patient's hemoglobin A1c did worsen however, due to the circumstances surrounding her recent surgery I feel this is a transient raise  Patient will be maintained on current dosage of metformin and I did reinforce the importance of limiting sugar and carbohydrates in her diet  Urine microalbumin was collected in the office today  Hemoglobin A1c will be reassessed in 6 months  Orders:  -     POCT hemoglobin A1c  -     metFORMIN (GLUCOPHAGE) 500 mg tablet; Take 1 tablet (500 mg total) by mouth 2 (two) times a day with meals  -     Microalbumin / creatinine urine ratio    2  Need for influenza vaccination  -     influenza vaccine, quadrivalent, recombinant, PF, 0 5 mL, for patients 18 yr+ (FLUBLOK)    3  Nontraumatic incomplete tear of infraspinatus tendon, right  Assessment & Plan:  Relafen 750 mg was ordered to be used as needed up to 2 times per day for right shoulder pain  Patient continues with PT and with orthopedics follow-up  Orders:  -     nabumetone (RELAFEN) 750 mg tablet; Take 1 tablet (750 mg total) by mouth 2 (two) times a day as needed for mild pain or moderate pain    4  Encounter for screening mammogram for malignant neoplasm of breast  -     Mammo screening bilateral w 3d & cad; Future; Expected date: 10/21/2022    5  Transient gluten sensitivity  Assessment & Plan:  Celiac disease profile was ordered to be collected prior to next office visit  Patient was advised to continue to avoid foods containing gluten  Orders:  -     Celiac Disease Antibody Profile; Future    6   Mixed hyperlipidemia  Assessment & Plan:  Patient does have lipid panel ordered to be completed prior to next office visit  Patient was advised to continue to limit fatty and fried foods in her diet  Subjective      Gluten insensitivity:  Patient reports noting a rash after eating bread  She reports that the rash is generalized on her face and neck  She reports that she did trial eating gluten free products and did not note a rash  Patient is requesting testing for celiac disease  Type II diabetes:  Patient's most recent hemoglobin A1c was 6 1 in March 2022  Patient is currently managed on metformin 500 mg b i d  Patient denies polydipsia, polyphagia, or polyuria  Patient's hemoglobin A1c was noted to have increased to 7 4 in the office today  The patient reports that she was prescribed 2 separate courses of steroids prior to her recent rotator cuff repair and also had to go 2 weeks without her metformin as she had no one to pick the medication up for her after her procedure  Hyperlipidemia:  Patient's most recent lipid panel showed elevated total cholesterol and LDL  Patient is not currently taking cholesterol medication  S/P right rotator cuff repair:  Patient had right rotator cuff repair completed in September 2022 with Dr My Aldana  She has been working with PT since that time  Patient was previously prescribed oxycodone 5 mg to be used as needed for her pain however, she has discontinued this medication and would prefer to take Relafen as needed  Review of Systems   Constitutional: Negative for chills and fever  HENT: Negative for ear pain and sore throat  Eyes: Negative for pain and visual disturbance  Respiratory: Negative for cough, chest tightness, shortness of breath and wheezing  Cardiovascular: Negative for chest pain, palpitations and leg swelling  Gastrointestinal: Negative for abdominal pain, constipation, diarrhea, nausea and vomiting     Endocrine: Negative for cold intolerance, heat intolerance, polydipsia, polyphagia and polyuria  Genitourinary: Negative for decreased urine volume, dysuria and hematuria  Musculoskeletal: Positive for arthralgias (right shoulder-S/P right rotator cuff repair)  Negative for back pain, joint swelling and myalgias  Skin: Negative for color change and rash  Allergic/Immunologic: Negative for environmental allergies  Neurological: Negative for dizziness, seizures and syncope  Hematological: Negative for adenopathy  Psychiatric/Behavioral: Negative for confusion  The patient is not nervous/anxious  All other systems reviewed and are negative  Current Outpatient Medications on File Prior to Visit   Medication Sig   • glucose blood test strip Use as instructed   • ketoconazole (NIZORAL) 2 % cream Apply topically daily   • [DISCONTINUED] metFORMIN (GLUCOPHAGE) 500 mg tablet Take 1 tablet (500 mg total) by mouth 2 (two) times a day with meals   • [DISCONTINUED] oxyCODONE (ROXICODONE) 5 immediate release tablet 1 tablets every 6 hours as needed for severe shoulder pain only  (Patient not taking: Reported on 10/21/2022)       Objective     /84 (BP Location: Left arm, Patient Position: Sitting, Cuff Size: Standard)   Pulse 97   Temp (!) 97 1 °F (36 2 °C) (Temporal)   SpO2 97%     Physical Exam  Vitals and nursing note reviewed  Constitutional:       General: She is not in acute distress  Appearance: Normal appearance  She is not ill-appearing  HENT:      Head: Normocephalic  Eyes:      Conjunctiva/sclera: Conjunctivae normal    Cardiovascular:      Rate and Rhythm: Normal rate and regular rhythm  Pulses: Normal pulses  Carotid pulses are 2+ on the right side and 2+ on the left side  Radial pulses are 2+ on the right side and 2+ on the left side  Posterior tibial pulses are 2+ on the right side and 2+ on the left side  Heart sounds: Normal heart sounds   No murmur heard   Pulmonary:      Effort: Pulmonary effort is normal  No respiratory distress  Breath sounds: Normal breath sounds  No wheezing or rhonchi  Abdominal:      General: Abdomen is flat  Bowel sounds are normal  There is no distension  Palpations: Abdomen is soft  Tenderness: There is no abdominal tenderness  There is no guarding  Musculoskeletal:      Right shoulder: Tenderness and bony tenderness present  No swelling or effusion  Decreased range of motion  Decreased strength  Cervical back: Normal range of motion  Right lower leg: No edema  Left lower leg: No edema  Comments: Right arm is in a sling  Patient denies any numbness, tingling, or other neurological changes of right upper extremity  Skin:     General: Skin is warm and dry  Capillary Refill: Capillary refill takes less than 2 seconds  Neurological:      General: No focal deficit present  Mental Status: She is alert and oriented to person, place, and time  Psychiatric:         Mood and Affect: Mood normal          Behavior: Behavior normal          Thought Content:  Thought content normal          Judgment: Judgment normal        Chestine Carol, NITESH

## 2022-10-21 NOTE — ASSESSMENT & PLAN NOTE
Relafen 750 mg was ordered to be used as needed up to 2 times per day for right shoulder pain  Patient continues with PT and with orthopedics follow-up

## 2022-10-24 ENCOUNTER — OFFICE VISIT (OUTPATIENT)
Dept: PHYSICAL THERAPY | Facility: REHABILITATION | Age: 57
End: 2022-10-24
Payer: COMMERCIAL

## 2022-10-24 ENCOUNTER — APPOINTMENT (OUTPATIENT)
Dept: LAB | Facility: CLINIC | Age: 57
End: 2022-10-24
Payer: COMMERCIAL

## 2022-10-24 DIAGNOSIS — E11.9 TYPE 2 DIABETES MELLITUS WITHOUT COMPLICATION, WITHOUT LONG-TERM CURRENT USE OF INSULIN (HCC): ICD-10-CM

## 2022-10-24 DIAGNOSIS — K90.0 TRANSIENT GLUTEN SENSITIVITY: ICD-10-CM

## 2022-10-24 DIAGNOSIS — M75.111 NONTRAUMATIC INCOMPLETE TEAR OF RIGHT ROTATOR CUFF: Primary | ICD-10-CM

## 2022-10-24 LAB
BACTERIA UR QL AUTO: NORMAL /HPF
BILIRUB UR QL STRIP: NEGATIVE
CLARITY UR: CLEAR
COLOR UR: ABNORMAL
GLUCOSE UR STRIP-MCNC: ABNORMAL MG/DL
HGB UR QL STRIP.AUTO: NEGATIVE
KETONES UR STRIP-MCNC: NEGATIVE MG/DL
LEUKOCYTE ESTERASE UR QL STRIP: NEGATIVE
NITRITE UR QL STRIP: NEGATIVE
NON-SQ EPI CELLS URNS QL MICRO: NORMAL /HPF
PH UR STRIP.AUTO: 6 [PH]
PROT UR STRIP-MCNC: NEGATIVE MG/DL
RBC #/AREA URNS AUTO: NORMAL /HPF
SP GR UR STRIP.AUTO: 1.02 (ref 1–1.03)
UROBILINOGEN UR STRIP-ACNC: <2 MG/DL
WBC #/AREA URNS AUTO: NORMAL /HPF

## 2022-10-24 PROCEDURE — 97140 MANUAL THERAPY 1/> REGIONS: CPT | Performed by: PHYSICAL THERAPIST

## 2022-10-24 PROCEDURE — 97110 THERAPEUTIC EXERCISES: CPT | Performed by: PHYSICAL THERAPIST

## 2022-10-24 PROCEDURE — 81001 URINALYSIS AUTO W/SCOPE: CPT

## 2022-10-24 PROCEDURE — 82784 ASSAY IGA/IGD/IGG/IGM EACH: CPT

## 2022-10-24 PROCEDURE — 36415 COLL VENOUS BLD VENIPUNCTURE: CPT

## 2022-10-24 PROCEDURE — 86231 EMA EACH IG CLASS: CPT

## 2022-10-24 PROCEDURE — 86364 TISS TRNSGLTMNASE EA IG CLAS: CPT

## 2022-10-24 PROCEDURE — 86258 DGP ANTIBODY EACH IG CLASS: CPT

## 2022-10-24 NOTE — PROGRESS NOTES
Daily Note     Today's date: 10/24/2022  Patient name: Isaias Low  : 1965  MRN: [id number]  Referring provider: Colette Dougherty  Dx:   Encounter Diagnosis     ICD-10-CM    1  Nontraumatic incomplete tear of supraspinatus tendon, right  M75 111        Start Time: 1140  Stop Time: 1220  Total time in clinic (min): 40 minutes    Subjective:   Pt bent over to pick an object off of the floor with her left arm and felt pain in her right shoulder  After last treatment, she c/o soreness into the night, but states her shoulder "felt much better" the next day  Also, the incision sites are less painful  Pt was at the PCP office for an unrelated visit last week  She was prescribed an anti-inflammatory  She has been taking that daily and feels like it is helping her pain and allows her to sleep longer  Objective: See treatment diary below  PROM performed in standing  Assessment: Tolerated treatment well  Patient would benefit from continued PT    Plan: Progress treament per protocol        Precautions: right shoulder surgery 22    Manuals 09/27 09/29 10/03 10/06 10/10 10/13 10/17 10/20 10/24    Tissue deformation - forearm flexors  LMR           Tissue deformation - distal biceps  LMR           Scar tissue massage - incision        LMR     Gr 1 oscillations - supine     LMR - extensive LMR LMR      Gr 1 oscillations - standing     LMR  S/l - LMR LMR LMR    Neuro Re-Ed 09/27 09/29 10/03 10/06 10/10 10/13 10/17 10/20 10/24                                                                                               Ther Ex 09/27 09/29 10/03 10/06 10/10 10/13 10/17 10/20 10/24    Supine R shoulder PROM LMR LMR LMR LMR supine and standing - LMR supine - LMR supine and s/l - LMR stand - LMR stand - LMR    Hand open/close 2x15 15           Seated scap retraction x9 x5 Stand - 3x5 x3  prone - 2x13  seated - 15     Walking - arm at side        300 ft 250 ft x3    Standing table flexion slides 2x15    Standing table abduction slides         15    Standing cw/ccw circles on table         30 ea                                           HEP LMR LMR    LMR  LMR LMR    Pt education - activity modification LMR    LMR        Prone arm hangs      3'       Elbow flex/ext arom  15      25     p-ball rollouts   25 3'    nv     Pt education - don/doff sling  LMR           Ther Activity                                       Gait Training                                       Modalities

## 2022-10-25 LAB
ENDOMYSIUM IGA SER QL: NEGATIVE
GLIADIN PEPTIDE IGA SER-ACNC: 4 UNITS (ref 0–19)
GLIADIN PEPTIDE IGG SER-ACNC: 1 UNITS (ref 0–19)
IGA SERPL-MCNC: 150 MG/DL (ref 87–352)
TTG IGA SER-ACNC: <2 U/ML (ref 0–3)
TTG IGG SER-ACNC: <2 U/ML (ref 0–5)

## 2022-10-27 ENCOUNTER — APPOINTMENT (OUTPATIENT)
Dept: PHYSICAL THERAPY | Facility: REHABILITATION | Age: 57
End: 2022-10-27

## 2022-10-31 ENCOUNTER — OFFICE VISIT (OUTPATIENT)
Dept: PHYSICAL THERAPY | Facility: REHABILITATION | Age: 57
End: 2022-10-31

## 2022-10-31 DIAGNOSIS — M75.111 NONTRAUMATIC INCOMPLETE TEAR OF RIGHT ROTATOR CUFF: Primary | ICD-10-CM

## 2022-10-31 NOTE — PROGRESS NOTES
Daily Note     Today's date: 10/31/2022  Patient name: Meek Vanegas  : 1965  MRN: [id number]  Referring provider: Odalys Parnell  Dx:   Encounter Diagnosis     ICD-10-CM    1  Nontraumatic incomplete tear of supraspinatus tendon, right  M75 111        Start Time: 1215  Stop Time: 1245  Total time in clinic (min): 30 minutes    Subjective: Pt states the last two days have been "rough"  She has been coming out of the sling at home, but states she holds her arm against her body as if she is protecting it  She is still sleeping in the recliner  Objective: See treatment diary below  Pt had a difficult time relaxing during manual PT today  Soft tissue palpation:  Sig tissue texture density of the UT, subscapularis, teres, and pectorals  Pt educated at length on the importance of avoiding guarding her arm to prevent compensatory strategies  Assessment: Tolerated treatment fair  Patient would benefit from continued PT    Plan: Continue per plan of care        Precautions: right shoulder surgery 22    Manuals 09/27 09/29 10/03 10/06 10/10 10/13 10/17 10/20 10/24 10/31   Tissue deformation - subscapularis - seated and supine          LMR   Tissue deformation - pectorals - seated          LMR   Tissue deformation - UT - seated          LMR                Tissue deformation - forearm flexors  LMR           Tissue deformation - distal biceps  LMR           Scar tissue massage - incision        LMR     Gr 1 oscillations - supine     LMR - extensive LMR LMR      Gr 1 oscillations - standing     LMR  S/l - LMR LMR LMR    Neuro Re-Ed 09/27 09/29 10/03 10/06 10/10 10/13 10/17 10/20 10/24 10/31                                                                                              Ther Ex 09/27 09/29 10/03 10/06 10/10 10/13 10/17 10/20 10/24 10/31   Supine R shoulder PROM LMR LMR LMR LMR supine and standing - LMR supine - LMR supine and s/l - LMR stand - LMR stand - LMR stand and supine - LMR   Hand open/close 2x15 15           Seated scap retraction x9 x5 Stand - 3x5 x3  prone - 2x13  seated - 15     Walking - arm at side        300 ft 250 ft x3    Standing table flexion slides         2x15    Standing table abduction slides         15    Standing cw/ccw circles on table         30 ea    Pulleys - flexion          4'                             HEP LMR LMR    LMR  LMR LMR    Pt education - activity modification LMR    LMR        Prone arm hangs      3'       Elbow flex/ext arom  15      25     p-ball rollouts   25 3'    nv     Pt education - don/doff sling  LMR           Ther Activity                                       Gait Training                                       Modalities

## 2022-11-03 ENCOUNTER — APPOINTMENT (OUTPATIENT)
Dept: PHYSICAL THERAPY | Facility: REHABILITATION | Age: 57
End: 2022-11-03

## 2022-11-07 ENCOUNTER — OFFICE VISIT (OUTPATIENT)
Dept: PHYSICAL THERAPY | Facility: REHABILITATION | Age: 57
End: 2022-11-07

## 2022-11-07 DIAGNOSIS — M75.111 NONTRAUMATIC INCOMPLETE TEAR OF RIGHT ROTATOR CUFF: Primary | ICD-10-CM

## 2022-11-07 NOTE — PROGRESS NOTES
Daily Note     Today's date: 2022  Patient name: Jacki Hammond  : 1965  MRN: [id number]  Referring provider: Jena Fink  Dx:   Encounter Diagnosis     ICD-10-CM    1  Nontraumatic incomplete tear of supraspinatus tendon, right  M75 111        Start Time: 1400  Stop Time: 1445  Total time in clinic (min): 45 minutes    Subjective: Patient reports feeling better overall but stiffness ensues  Objective: See treatment diary below  PROM:   Flexion ~ 110 deg  ER ~ 50 deg @ 60 deg ABD  ABD ~ 90 deg    Assessment: Tolerated treatment well  TTP along pec minor and teres major which is contributing to hypomobility  Very firm end feels into ABD and ER  Had improved ROM post session  Cues provided with flexion ROM activity to decrease shoulder shrug  Plan: Continue per plan of care        Precautions: right shoulder surgery 22    Manuals 11/7 09/29 10/03 10/06 10/10 10/13 10/17 10/20 10/24 10/31   Tissue deformation - subscapularis - seated and supine supine - AB         LMR   Tissue deformation - pectorals - seated Supine AB         LMR   Tissue deformation - UT - seated          LMR                Tissue deformation - forearm flexors  LMR           Tissue deformation - distal biceps  LMR           Scar tissue massage - incision        LMR     Gr 1 oscillations - supine G3 inferior glides-AB    LMR - extensive LMR LMR      Gr 1 oscillations - standing     LMR  S/l - LMR LMR LMR    Neuro Re-Ed 11/7 09/29 10/03 10/06 10/10 10/13 10/17 10/20 10/24 10/31                                                                                              Ther Ex 11/7 09/29 10/03 10/06 10/10 10/13 10/17 10/20 10/24 10/31   Supine R shoulder PROM AB-15' LMR LMR LMR supine and standing - LMR supine - LMR supine and s/l - LMR stand - LMR stand - LMR stand and supine - LMR   Hand open/close  15           Seated scap retraction  x5 Stand - 3x5 x3  prone - 2x13  seated - 15     Walking - arm at side 300 ft 250 ft x3    Standing table flexion slides         2x15    Standing table abduction slides         15    Standing cw/ccw circles on table         30 ea    Pulleys - flexion 5'         4'   Cane ER 5"x10            Flexion slides and stick mobility for flexion 5"x10 each            HEP  LMR    LMR  LMR LMR    Pt education - activity modification     LMR        Prone arm hangs      3'       Elbow flex/ext arom  15      25     p-ball rollouts   25 3'    nv     Pt education - don/doff sling  LMR           Ther Activity                                       Gait Training                                       Modalities

## 2022-11-10 ENCOUNTER — OFFICE VISIT (OUTPATIENT)
Dept: OBGYN CLINIC | Facility: OTHER | Age: 57
End: 2022-11-10

## 2022-11-10 ENCOUNTER — OFFICE VISIT (OUTPATIENT)
Dept: PHYSICAL THERAPY | Facility: REHABILITATION | Age: 57
End: 2022-11-10

## 2022-11-10 VITALS
HEART RATE: 91 BPM | BODY MASS INDEX: 26.63 KG/M2 | SYSTOLIC BLOOD PRESSURE: 110 MMHG | DIASTOLIC BLOOD PRESSURE: 71 MMHG | HEIGHT: 64 IN | WEIGHT: 156 LBS

## 2022-11-10 DIAGNOSIS — M75.111 NONTRAUMATIC INCOMPLETE TEAR OF RIGHT ROTATOR CUFF: Primary | ICD-10-CM

## 2022-11-10 DIAGNOSIS — Z47.89 AFTERCARE FOLLOWING SURGERY OF THE MUSCULOSKELETAL SYSTEM: Primary | ICD-10-CM

## 2022-11-10 NOTE — PROGRESS NOTES
Surgery: SARS w/RCR on 9/23/2022    S: Pt complaining of stiffness  Attending therapy twice a week  States she has been doing her exercises at home    /71 (BP Location: Left arm, Patient Position: Sitting, Cuff Size: Adult)   Pulse 91   Ht 5' 3 5" (1 613 m)   Wt 70 8 kg (156 lb)   BMI 27 20 kg/m²     O: right shoulder  FF: 30 (passive 80)  Abd: 40 (passive 90)  ER: 0 (contralateral 70)  IR:right buttock  Good elbow, wrist and hand range of motion  Skin - warm and dry  SI  NVI    A/P: 6 weeks following arthroscopic right shoulder rotator cuff repair  1  Ciera Jewell should be doing stretches multiple times a day  2  Formal physical therapy - following accelerated protocol - only progress to strengthening when motion allows  3  Pain control - Tylenol 1000 mg every 8 hours  4  Ice as needed - 20 minutes on and 20 minutes off  5  Follow up in 6-8 weeks  6  Note for work provided  Works as  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

## 2022-11-10 NOTE — PROGRESS NOTES
Daily Note     Today's date: 11/10/2022  Patient name: Katarina Dale  : 1965  MRN: [id number]  Referring provider: Jet Daily  Dx:   Encounter Diagnosis     ICD-10-CM    1  Nontraumatic incomplete tear of supraspinatus tendon, right  M75 111        Start Time: 1400  Stop Time: 1440  Total time in clinic (min): 40 minutes    Subjective: Pt f/u with the MD office today and was released to RTW on 22  Objective: See treatment diary below  HEP updated with shoulder flexion counter slides  Assessment: Tolerated treatment well  Patient would benefit from continued PT    Plan: Continue per plan of care        Precautions: right shoulder surgery 22    Manuals 11/7 11/10           Tissue deformation - subscapularis - seated and supine supine - AB            Tissue deformation - pectorals - seated Supine AB LMR - supine           Tissue deformation - UT - seated                          Tissue deformation - forearm flexors             Tissue deformation - distal biceps             Scar tissue massage - incision             Gr 1 oscillations - supine G3 inferior glides-AB            Gr 1 oscillations - standing             Neuro Re-Ed 11/7 11/10                                                                                                      Ther Ex 11/7 11/10           Supine R shoulder PROM AB-15' supine and stand - LMR                        S/l Ue ranger  3'           S/l shoulder abduction arom  2x5                                                  Pulleys - flexion 5' 5'           Cane ER 5"x10 x10           Flexion slides and stick mobility for flexion 5"x10 each            HEP  LMR           Pt education - activity modification             Prone arm hangs             Elbow flex/ext arom             p-ball rollouts             Pt education - don/doff sling             Ther Activity                                       Gait Training Modalities

## 2022-11-10 NOTE — LETTER
November 10, 2022     Patient: Mavis Khan  YOB: 1965  Date of Visit: 11/10/2022      To Whom it May Concern:    Mavis Khan is under my professional care  Ken Addison was seen in my office on 11/10/2022  Ken Addison may return to work on 11/14/22  She does not have any orthopedic restrictions for driving  If you have any questions or concerns, please don't hesitate to call           Sincerely,          Corinne Mercy, PA-C        CC: No Recipients

## 2022-11-14 ENCOUNTER — OFFICE VISIT (OUTPATIENT)
Dept: PHYSICAL THERAPY | Facility: REHABILITATION | Age: 57
End: 2022-11-14

## 2022-11-14 DIAGNOSIS — M75.111 NONTRAUMATIC INCOMPLETE TEAR OF RIGHT ROTATOR CUFF: Primary | ICD-10-CM

## 2022-11-14 NOTE — PROGRESS NOTES
Daily Note     Today's date: 2022  Patient name: Aly Castaneda  : 1965  MRN: [id number]  Referring provider: Mary Johnson  Dx:   Encounter Diagnosis     ICD-10-CM    1  Nontraumatic incomplete tear of supraspinatus tendon, right  M75 111        Start Time: 1210  Stop Time: 1255  Total time in clinic (min): 45 minutes    Subjective: Pt RTW today for the first time since surgery  She states her armpit is hurting  She has been modifying the way she is typing so she doesn't have to hold her arm up  Pt requests ice at the end of treatment  Objective: See treatment diary below  Pt educated on the importance of consistent compliance with the HEP several times t/o the day to maximize shoulder ROM  HEP updated with OH reach/roll on wall to be performed at least three times per day for [place] mobility  Assessment: Tolerated treatment well  Patient would benefit from continued PT    Plan: Continue per plan of care        Precautions: right shoulder surgery 22    Manuals 11/7 11/10 11/14          Tissue deformation - subscapularis - seated and supine supine - AB            Tissue deformation - pectorals - seated Supine AB LMR - supine           Tissue deformation - UT - seated             S/l - sustained pressure subscapularis with active shoulder abduction   LMR          Prone tissue deformation = med scap border   LMR                                    Gr 1 oscillations - supine G3 inferior glides-AB            Gr 1 oscillations - standing             Neuro Re-Ed 11/7 11/10 11/14          S/l shoulder flexion   2x15          S/l shoulder abduction   2x15                                                                           Ther Ex 11/7 11/10 11/14          Supine R shoulder PROM AB-15' supine and stand - LMR supine - LMR          OH rollouts on wall   3x5          S/l Ue ranger  3'           S/l shoulder abduction arom  2x5                        Prone 90/90 shoulder er stretch 1'x2          Pulleys - IR - standing   3'          Pulleys - flexion 5' 5' 3'          Cane ER 5"x10 x10           Flexion slides and stick mobility for flexion 5"x10 each            HEP  LMR LMR          Pt education - activity modification             Prone arm hangs   8# - 3'          Ther Activity                                       Gait Training                                       Modalities   11/14          CP   10'

## 2022-11-17 ENCOUNTER — OFFICE VISIT (OUTPATIENT)
Dept: PHYSICAL THERAPY | Facility: REHABILITATION | Age: 57
End: 2022-11-17

## 2022-11-17 DIAGNOSIS — M75.111 NONTRAUMATIC INCOMPLETE TEAR OF RIGHT ROTATOR CUFF: Primary | ICD-10-CM

## 2022-11-17 DIAGNOSIS — M75.111 NONTRAUMATIC INCOMPLETE TEAR OF RIGHT ROTATOR CUFF: ICD-10-CM

## 2022-11-17 RX ORDER — NABUMETONE 750 MG/1
TABLET, FILM COATED ORAL
Qty: 60 TABLET | Refills: 0 | Status: SHIPPED | OUTPATIENT
Start: 2022-11-17

## 2022-11-17 NOTE — PROGRESS NOTES
Daily Note     Today's date: 2022  Patient name: Ajay Schmidt  : 1965  MRN: [id number]  Referring provider: Hay King  Dx:   Encounter Diagnosis     ICD-10-CM    1  Nontraumatic incomplete tear of supraspinatus tendon, right  M75 111           Start Time: 1700  Stop Time: 1740  Total time in clinic (min): 40 minutes    Subjective: Pt states she completed her HEP one time yesterday  She states her right elbow has been bothering her  Objective: See treatment diary below  Pt educated at length and reminded again on the importance of completing her HEP not only daily but multiple times a day to maximize outcomes  Pt advised to focus on OH flexion rollouts and seated abduction table slides  Assessment: Tolerated treatment fair  Patient would benefit from continued PT    Plan: Continue per plan of care  focus on soft tissue in the cx and misael scap region and restoring GH ROM        Precautions: right shoulder surgery 22    Manuals 11/7 11/10 11/14 11/17         Tissue deformation - subscapularis - seated and supine supine - AB            Tissue deformation - pectorals - seated Supine AB LMR - supine           Tissue deformation - UT - seated    LMR         S/l - sustained pressure subscapularis with active shoulder abduction   LMR          Prone tissue deformation = med scap border   LMR          Sustained pressure right UT with active shoulder rom    LMR                      Gr 1 oscillations - supine G3 inferior glides-AB   LMR         Gr 1 oscillations - standing             Neuro Re-Ed 11/7 11/10 11/14 11/17         S/l shoulder flexion   2x15          S/l shoulder abduction   2x15                                                                           Ther Ex 11/7 11/10 11/14 11/17         Supine R shoulder PROM AB-15' supine and stand - LMR supine - LMR supine and seated - LMR         OH rollouts on wall   3x5          S/l Ue ranger  3'  3'         S/l shoulder abduction arom  2x5           Abduction table slides    15         Prone 90/90 shoulder er stretch   1'x2          Pulleys - IR - standing   3'          Pulleys - flexion 5' 5' 3' 4'         Cane ER 5"x10 x10           Flexion slides and stick mobility for flexion 5"x10 each            HEP  LMR LMR LMR         Pt education - activity modification             Prone arm hangs   8# - 3'          Ther Activity                                       Gait Training                                       Modalities   11/14          CP   10'

## 2022-11-21 ENCOUNTER — OFFICE VISIT (OUTPATIENT)
Dept: PHYSICAL THERAPY | Facility: REHABILITATION | Age: 57
End: 2022-11-21

## 2022-11-21 DIAGNOSIS — M75.111 NONTRAUMATIC INCOMPLETE TEAR OF RIGHT ROTATOR CUFF: Primary | ICD-10-CM

## 2022-11-21 NOTE — PROGRESS NOTES
Daily Note     Today's date: 2022  Patient name: Shabbir Stone  : 1965  MRN: [id number]  Referring provider: Zoe Astorga  Dx:   Encounter Diagnosis     ICD-10-CM    1  Nontraumatic incomplete tear of supraspinatus tendon, right  M75 111                      Subjective: No new complaints  Objective: See treatment diary below  Pt educated at length and reminded again on the importance of completing her HEP not only daily but multiple times a day to maximize outcomes  Pt advised to focus on OH flexion rollouts and seated abduction table slides  Assessment: Tolerated treatment fair  Patient exhibited good technique with therapeutic exercises and would benefit from continued PT    Plan: Continue per plan of care  focus on soft tissue in the cx and misael scap region and restoring GH ROM        Precautions: right shoulder surgery 22    Manuals 11/7 11/10 11/14 11/17 11/21        Tissue deformation - subscapularis - seated and supine supine - AB            Tissue deformation - pectorals - seated Supine AB LMR - supine           Tissue deformation - UT - seated    LMR RK        S/l - sustained pressure subscapularis with active shoulder abduction   LMR          Prone tissue deformation = med scap border   LMR  RK        Sustained pressure right UT with active shoulder rom    LMR RK                     Gr 1 oscillations - supine G3 inferior glides-AB   LMR         Gr 1 oscillations - standing             Neuro Re-Ed 11/7 11/10 11/14 11/17         S/l shoulder flexion   2x15          S/l shoulder abduction   2x15                                                                           Ther Ex 11/7 11/10 11/14 11/17 11/21        Supine R shoulder PROM AB-15' supine and stand - LMR supine - LMR supine and seated - LMR RK        OH rollouts on wall   3x5          S/l Ue ranger  3'  3' 3'        S/l shoulder abduction arom  2x5           Abduction table slides    15 2x10        Prone 90/90 shoulder er stretch   1'x2          Pulleys - IR - standing   3'          Pulleys - flexion 5' 5' 3' 4' 4'        Cane ER 5"x10 x10           Flexion slides and stick mobility for flexion 5"x10 each            HEP  LMR LMR LMR         Pt education - activity modification             Prone arm hangs   8# - 3'          Ther Activity                                       Gait Training                                       Modalities   11/14 11/21        CP   10'  10'

## 2022-11-25 ENCOUNTER — OFFICE VISIT (OUTPATIENT)
Dept: PHYSICAL THERAPY | Facility: REHABILITATION | Age: 57
End: 2022-11-25

## 2022-11-25 DIAGNOSIS — M75.111 NONTRAUMATIC INCOMPLETE TEAR OF RIGHT ROTATOR CUFF: Primary | ICD-10-CM

## 2022-11-25 NOTE — PROGRESS NOTES
Daily Note     Today's date: 2022  Patient name: Sophie Phillips  : 1965  MRN: [id number]  Referring provider: Jaylene Huston  Dx:   Encounter Diagnosis     ICD-10-CM    1  Nontraumatic incomplete tear of supraspinatus tendon, right  M75 111           Start Time: 0810  Stop Time: 0850  Total time in clinic (min): 40 minutes    Subjective: Pt states she is performing her HEP every hour  Objective: See treatment diary below  Right shoulder ROM improving  Assessment: Tolerated treatment well  Patient would benefit from continued PT    Plan: Continue per plan of care        Precautions: right shoulder surgery 22    Manuals 11/7 11/10 11/14 11/17 11/21 11/25       Tissue deformation - subscapularis - seated and supine supine - AB            Tissue deformation - pectorals - seated Supine AB LMR - supine           Tissue deformation - UT - seated    LMR RK        S/l - sustained pressure subscapularis with active shoulder abduction   LMR          Prone tissue deformation = med scap border   LMR  RK        Sustained pressure right UT with active shoulder rom    LMR RK                     Gr 1 oscillations - supine G3 inferior glides-AB   LMR         Gr 1 oscillations - standing             Neuro Re-Ed 11/7 11/10 11/14 11/17  11/25       S/l shoulder flexion   2x15   nv       S/l shoulder abduction   2x15   galindo ROM - 2x15       Prone scap retractions      5"x15                                                           Ther Ex 11/7 11/10 11/14 11/17 11/21 11/25       Supine R shoulder PROM AB-15' supine and stand - LMR supine - LMR supine and seated - LMR RK LMR       OH rollouts on wall   3x5   x15       S/l Ue ranger  3'  3' 3'        S/l shoulder abduction arom  2x5           Abduction table slides    15 2x10        Prone 90/90 shoulder er stretch   1'x2   hep       Pulleys - IR - standing   3'          Pulleys - flexion 5' 5' 3' 4' 4' 5'       Cane ER 5"x10 x10           Flexion slides and stick mobility for flexion 5"x10 each            HEP  LMR LMR LMR         Pt education - activity modification             Prone arm hangs   8# - 3'          Ther Activity                                       Gait Training                                       Modalities   11/14 11/21        CP   10'  10'

## 2022-12-01 ENCOUNTER — OFFICE VISIT (OUTPATIENT)
Dept: PHYSICAL THERAPY | Facility: REHABILITATION | Age: 57
End: 2022-12-01

## 2022-12-01 DIAGNOSIS — M75.111 NONTRAUMATIC INCOMPLETE TEAR OF RIGHT ROTATOR CUFF: Primary | ICD-10-CM

## 2022-12-01 NOTE — PROGRESS NOTES
Daily Note     Today's date: 2022  Patient name: Vicente Gautam  : 1965  MRN: [id number]  Referring provider: Hannah Awan  Dx:   Encounter Diagnosis     ICD-10-CM    1  Nontraumatic incomplete tear of supraspinatus tendon, right  M75 111           Start Time: 1125  Stop Time: 1205  Total time in clinic (min): 40 minutes    Subjective: Pt feels her shoulder ROM is steadily improving and she is having less pain  Objective: See treatment diary below    Assessment: Tolerated treatment well  Patient would benefit from continued PT    Plan: Continue per plan of care        Precautions: right shoulder surgery 22    Manuals 11/7 11/10 11/14 11/17 11/21 11/25 12/01      Tissue deformation - subscapularis - seated and supine supine - AB      supine - LMR      Tissue deformation - pectorals - seated Supine AB LMR - supine           Tissue deformation - UT - seated    LMR RK        S/l - sustained pressure subscapularis with active shoulder abduction   LMR          Prone tissue deformation = med scap border   LMR  RK        Sustained pressure right UT with active shoulder rom    LMR RK                     Gr 1 oscillations - supine G3 inferior glides-AB   LMR   LMR      Gr 1 oscillations - standing             Neuro Re-Ed 11/7 11/10 11/14 11/17  11/25 12/01      S/l shoulder flexion   2x15   nv       S/l shoulder abduction   2x15   galindo ROM - 2x15       Prone scap retractions      5"x15                                                           Ther Ex 11/7 11/10 11/14 11/17 11/21 11/25 12/01      Supine R shoulder PROM AB-15' supine and stand - LMR supine - LMR supine and seated - LMR RK LMR LMR      OH rollouts on wall   3x5   x15       S/l Ue ranger  3'  3' 3'        S/l shoulder abduction arom  2x5           Abduction table slides    15 2x10        Prone 90/90 shoulder er stretch   1'x2   hep 5'      Pulleys - IR - standing   3'          Pulleys - flexion 5' 5' 3' 4' 4' 5' 3'      Cane ER 5"x10 x10           ube       3'/3'      Prone scap retraction + shoulder ext       3x15                   Flexion slides and stick mobility for flexion 5"x10 each            HEP  LMR LMR LMR   LMR      Pt education - activity modification             Prone arm hangs   8# - 3'          Ther Activity                                       Gait Training                                       Modalities   11/14 11/21        CP   10'  10'

## 2022-12-03 ENCOUNTER — NURSE TRIAGE (OUTPATIENT)
Dept: OTHER | Facility: OTHER | Age: 57
End: 2022-12-03

## 2022-12-03 NOTE — TELEPHONE ENCOUNTER
Tried to contact pharmacy, line was busy  Informed patient they can call to transfer themselves  Patient verbalized understanding  Regarding: medication dispencing issue  ----- Message from Davis Simeon sent at 12/3/2022  3:18 PM EST -----  "I was in the office on 10/21/2022 and my doctor sent my medication to the wrong pharmacy and I am currently out and I need this today   I need a refill of my metFORMIN (GLUCOPHAGE) 500 mg tabletPlease sent to  [address], [address]   Phone:  [phone]

## 2022-12-05 ENCOUNTER — APPOINTMENT (OUTPATIENT)
Dept: PHYSICAL THERAPY | Facility: REHABILITATION | Age: 57
End: 2022-12-05

## 2022-12-07 ENCOUNTER — APPOINTMENT (OUTPATIENT)
Dept: PHYSICAL THERAPY | Facility: REHABILITATION | Age: 57
End: 2022-12-07

## 2022-12-09 ENCOUNTER — APPOINTMENT (OUTPATIENT)
Dept: PHYSICAL THERAPY | Facility: REHABILITATION | Age: 57
End: 2022-12-09

## 2022-12-12 ENCOUNTER — APPOINTMENT (OUTPATIENT)
Dept: PHYSICAL THERAPY | Facility: REHABILITATION | Age: 57
End: 2022-12-12

## 2022-12-16 ENCOUNTER — APPOINTMENT (OUTPATIENT)
Dept: PHYSICAL THERAPY | Facility: REHABILITATION | Age: 57
End: 2022-12-16

## 2022-12-19 ENCOUNTER — APPOINTMENT (OUTPATIENT)
Dept: PHYSICAL THERAPY | Facility: REHABILITATION | Age: 57
End: 2022-12-19

## 2022-12-22 ENCOUNTER — OFFICE VISIT (OUTPATIENT)
Dept: OBGYN CLINIC | Facility: OTHER | Age: 57
End: 2022-12-22

## 2022-12-22 VITALS
WEIGHT: 155.16 LBS | SYSTOLIC BLOOD PRESSURE: 116 MMHG | HEART RATE: 97 BPM | HEIGHT: 64 IN | DIASTOLIC BLOOD PRESSURE: 86 MMHG | BODY MASS INDEX: 26.49 KG/M2

## 2022-12-22 DIAGNOSIS — M75.111 NONTRAUMATIC INCOMPLETE TEAR OF RIGHT ROTATOR CUFF: ICD-10-CM

## 2022-12-22 DIAGNOSIS — M75.01 ADHESIVE CAPSULITIS OF RIGHT SHOULDER: Primary | ICD-10-CM

## 2022-12-22 NOTE — PROGRESS NOTES
Assessment  Diagnoses and all orders for this visit:    Post operative Adhesive capsulitis of right shoulder    Nontraumatic incomplete tear of infraspinatus tendon, right, s/p repair    Discussion and Plan:    · Patient is 3 months s/p right arthroscopic rotator cuff repair performed on 9/23/22  She also developed adhesive capsulitis post operatively  She has had improvements with her ROM and symptoms since her last visit and continuation of PT/HEP  · May transition to home exercise program as tolerated  · Follow up on an as needed basis    Subjective:   Patient ID: Chip Canseco is a 62 y o  female    63 y/o female who presents today 3 months s/p arthroscopic rotator cuff repair of the right shoulder  She developed adhesive capsulitis post operatively  Today, she notes continued improvements with her ROM and pain  She has been performing PT/HEP with significant benefit  Overall, she is very happy with her progress so far post operatively  No numbness or tingling  No fevers or chills  The following portions of the patient's history were reviewed and updated as appropriate: allergies, current medications, past family history, past medical history, past social history, past surgical history and problem list     Objective:  /86 (BP Location: Left arm, Patient Position: Sitting, Cuff Size: Standard)   Pulse 97   Ht 5' 3 5" (1 613 m)   Wt 70 4 kg (155 lb 2 6 oz)   BMI 27 05 kg/m²       Right Shoulder Exam     Tenderness   The patient is experiencing no tenderness  Range of Motion   External rotation: 50   Forward flexion: 140     Other   Erythema: absent  Sensation: normal  Pulse: present            Physical Exam  Constitutional:       Appearance: She is well-developed  Eyes:      Pupils: Pupils are equal, round, and reactive to light  Pulmonary:      Effort: Pulmonary effort is normal       Breath sounds: Normal breath sounds  Skin:     General: Skin is warm and dry     Neurological: Mental Status: She is alert and oriented to person, place, and time  Psychiatric:         Behavior: Behavior normal          Thought Content:  Thought content normal          Judgment: Judgment normal        Scribe Attestation    I,:  Alvarado Baird am acting as a scribe while in the presence of the attending physician :       I,:  Robbin Gillis MD personally performed the services described in this documentation    as scribed in my presence :

## 2022-12-23 ENCOUNTER — APPOINTMENT (OUTPATIENT)
Dept: PHYSICAL THERAPY | Facility: REHABILITATION | Age: 57
End: 2022-12-23

## 2022-12-27 ENCOUNTER — APPOINTMENT (OUTPATIENT)
Dept: PHYSICAL THERAPY | Facility: REHABILITATION | Age: 57
End: 2022-12-27

## 2022-12-27 ENCOUNTER — OFFICE VISIT (OUTPATIENT)
Dept: PHYSICAL THERAPY | Facility: REHABILITATION | Age: 57
End: 2022-12-27

## 2022-12-27 DIAGNOSIS — M75.111 NONTRAUMATIC INCOMPLETE TEAR OF RIGHT ROTATOR CUFF: Primary | ICD-10-CM

## 2022-12-27 NOTE — PROGRESS NOTES
Daily Note     Today's date: 2022  Patient name: Luz Jessica  : 1965  MRN: [id number]  Referring provider: J Carlos Carney  Dx:   Encounter Diagnosis     ICD-10-CM    1  Nontraumatic incomplete tear of supraspinatus tendon, right  M75 111           Start Time: 1230  Stop Time: 1300  Total time in clinic (min): 30 minutes    Subjective: Pt f/u with the surgeon last week  Objective: See treatment diary below  HEP updated with low trap retractions  Red band issued  Assessment: Tolerated treatment well  Patient would benefit from continued PT    Plan: Continue per plan of care   update hep next visit with expected d/c     Precautions: right shoulder surgery 22    Manuals 11/7 11/10 11/14 11/17 11/21 11/25 12/01 12/27     Tissue deformation - subscapularis - seated and supine supine - AB      supine - LMR      Tissue deformation - pectorals - seated Supine AB LMR - supine           Tissue deformation - UT - seated    LMR RK        S/l - sustained pressure subscapularis with active shoulder abduction   LMR          Prone tissue deformation = med scap border   LMR  RK        Sustained pressure right UT with active shoulder rom    LMR RK                     Gr 1 oscillations - supine G3 inferior glides-AB   LMR   LMR      Gr 1 oscillations - standing             Neuro Re-Ed 11/7 11/10 11/14 11/17  11/25 12/01 12/27     S/l shoulder flexion   2x15   nv       S/l shoulder abduction   2x15   galindo ROM - 2x15       Prone scap retractions      5"x15                                                           Ther Ex 11/7 11/10 11/14 11/17 11/21 11/25 12/01 12/27     Supine R shoulder PROM AB-15' supine and stand - LMR supine - LMR supine and seated - LMR RK LMR LMR LMR     OH rollouts on wall   3x5   x15       S/l Ue ranger  3'  3' 3'        S/l shoulder abduction arom  2x5           Abduction table slides    15 2x10        Prone 90/90 shoulder er stretch   1'x2   hep 5'      Pulleys - IR - standing   3'          Pulleys - flexion 5' 5' 3' 4' 4' 5' 3'      Cane ER 5"x10 x10           ube       3'/3'      Prone scap retraction + shoulder ext       3x15                   Flexion slides and stick mobility for flexion 5"x10 each            HEP  LMR LMR LMR   LMR LMR     Prone shoulder ext          5"x15   Prone shoulder abd          5"x12   XS - shoulder ext          3# 3x5   XS - row          7# 2x5   Suitcase carry          10# - 250 ft   Low trap retractions          orange 2x5   Pt education - activity modification             Prone arm hangs   8# - 3'          Ther Activity                                       Gait Training                                       Modalities   11/14 11/21        CP   10'  10'

## 2022-12-29 ENCOUNTER — APPOINTMENT (OUTPATIENT)
Dept: PHYSICAL THERAPY | Facility: REHABILITATION | Age: 57
End: 2022-12-29

## 2022-12-30 ENCOUNTER — APPOINTMENT (OUTPATIENT)
Dept: PHYSICAL THERAPY | Facility: REHABILITATION | Age: 57
End: 2022-12-30

## 2023-01-05 NOTE — PROGRESS NOTES
01/05/23 - last scheduled PT appointment cx'ed due to conflict in provider's schedule  D/c due to an insurance change  HEP updated

## 2023-03-08 DIAGNOSIS — E11.9 TYPE 2 DIABETES MELLITUS WITHOUT COMPLICATION, WITHOUT LONG-TERM CURRENT USE OF INSULIN (HCC): ICD-10-CM

## 2023-03-08 NOTE — TELEPHONE ENCOUNTER
2-month supply given  Patient must schedule an office visit for follow-up  Please also advise her to have her blood work completed prior to her next office visit

## 2023-03-08 NOTE — TELEPHONE ENCOUNTER
Medication: Metformin 500 mg   Day supply:   Pharmacy: [id number]     Last office visit: 11/17/2022    Upcoming office visit: Visit date not found

## 2023-03-17 ENCOUNTER — TELEPHONE (OUTPATIENT)
Dept: FAMILY MEDICINE CLINIC | Facility: CLINIC | Age: 58
End: 2023-03-17

## 2023-03-17 DIAGNOSIS — E11.9 TYPE 2 DIABETES MELLITUS WITHOUT COMPLICATION, WITHOUT LONG-TERM CURRENT USE OF INSULIN (HCC): Primary | ICD-10-CM

## 2023-03-17 DIAGNOSIS — E78.2 MIXED HYPERLIPIDEMIA: ICD-10-CM

## 2023-03-20 ENCOUNTER — APPOINTMENT (OUTPATIENT)
Dept: LAB | Facility: CLINIC | Age: 58
End: 2023-03-20

## 2023-03-20 DIAGNOSIS — E78.2 MIXED HYPERLIPIDEMIA: ICD-10-CM

## 2023-03-20 DIAGNOSIS — E11.9 TYPE 2 DIABETES MELLITUS WITHOUT COMPLICATION, WITHOUT LONG-TERM CURRENT USE OF INSULIN (HCC): ICD-10-CM

## 2023-03-20 LAB
ALBUMIN SERPL BCP-MCNC: 4.2 G/DL (ref 3.5–5)
ALP SERPL-CCNC: 132 U/L (ref 46–116)
ALT SERPL W P-5'-P-CCNC: 31 U/L (ref 12–78)
ANION GAP SERPL CALCULATED.3IONS-SCNC: 3 MMOL/L (ref 4–13)
AST SERPL W P-5'-P-CCNC: 26 U/L (ref 5–45)
BILIRUB SERPL-MCNC: 0.27 MG/DL (ref 0.2–1)
BUN SERPL-MCNC: 11 MG/DL (ref 5–25)
CALCIUM SERPL-MCNC: 10 MG/DL (ref 8.3–10.1)
CHLORIDE SERPL-SCNC: 108 MMOL/L (ref 96–108)
CHOLEST SERPL-MCNC: 228 MG/DL
CO2 SERPL-SCNC: 27 MMOL/L (ref 21–32)
CREAT SERPL-MCNC: 0.64 MG/DL (ref 0.6–1.3)
GFR SERPL CREATININE-BSD FRML MDRD: 99 ML/MIN/1.73SQ M
GLUCOSE P FAST SERPL-MCNC: 111 MG/DL (ref 65–99)
HDLC SERPL-MCNC: 80 MG/DL
LDLC SERPL CALC-MCNC: 132 MG/DL (ref 0–100)
POTASSIUM SERPL-SCNC: 3.9 MMOL/L (ref 3.5–5.3)
PROT SERPL-MCNC: 7.8 G/DL (ref 6.4–8.4)
SODIUM SERPL-SCNC: 138 MMOL/L (ref 135–147)
TRIGL SERPL-MCNC: 81 MG/DL

## 2023-03-21 DIAGNOSIS — E78.2 MIXED HYPERLIPIDEMIA: Primary | ICD-10-CM

## 2023-03-21 RX ORDER — ATORVASTATIN CALCIUM 10 MG/1
10 TABLET, FILM COATED ORAL DAILY
Qty: 90 TABLET | Refills: 1 | Status: SHIPPED | OUTPATIENT
Start: 2023-03-21 | End: 2023-03-23

## 2023-03-23 ENCOUNTER — OFFICE VISIT (OUTPATIENT)
Dept: FAMILY MEDICINE CLINIC | Facility: CLINIC | Age: 58
End: 2023-03-23

## 2023-03-23 VITALS
DIASTOLIC BLOOD PRESSURE: 84 MMHG | BODY MASS INDEX: 29.02 KG/M2 | TEMPERATURE: 97.5 F | WEIGHT: 170 LBS | HEIGHT: 64 IN | SYSTOLIC BLOOD PRESSURE: 122 MMHG

## 2023-03-23 DIAGNOSIS — E78.2 MIXED HYPERLIPIDEMIA: ICD-10-CM

## 2023-03-23 DIAGNOSIS — E11.9 TYPE 2 DIABETES MELLITUS WITHOUT COMPLICATION, WITHOUT LONG-TERM CURRENT USE OF INSULIN (HCC): Primary | ICD-10-CM

## 2023-03-23 DIAGNOSIS — Z12.31 ENCOUNTER FOR SCREENING MAMMOGRAM FOR MALIGNANT NEOPLASM OF BREAST: ICD-10-CM

## 2023-03-23 DIAGNOSIS — J30.9 ALLERGIC RHINITIS, UNSPECIFIED SEASONALITY, UNSPECIFIED TRIGGER: ICD-10-CM

## 2023-03-23 DIAGNOSIS — M65.312 TRIGGER FINGER OF LEFT THUMB: ICD-10-CM

## 2023-03-23 DIAGNOSIS — Z12.4 SCREENING FOR CERVICAL CANCER: ICD-10-CM

## 2023-03-23 LAB — SL AMB POCT HEMOGLOBIN AIC: 7 (ref ?–6.5)

## 2023-03-23 RX ORDER — MONTELUKAST SODIUM 10 MG/1
10 TABLET ORAL
Qty: 90 TABLET | Refills: 1 | Status: SHIPPED | OUTPATIENT
Start: 2023-03-23

## 2023-03-23 NOTE — ASSESSMENT & PLAN NOTE
Patient was advised to continue Claritin-D in the morning and was also started on Singulair at bedtime to help with her allergy symptoms

## 2023-03-23 NOTE — ASSESSMENT & PLAN NOTE
Orthopedics referral was placed to see if patient qualifies for trigger point injection  Patient was advised to continue to ice the area as needed and to use NSAIDs and Tylenol as needed for pain

## 2023-03-23 NOTE — ASSESSMENT & PLAN NOTE
Repeat lipid panel was ordered to be completed prior to next office visit  Patient was advised to continue to limit fatty and fried foods in her diet  If cholesterol levels are still elevated at her next office visit I will once again discuss starting a statin with the patient

## 2023-03-23 NOTE — PROGRESS NOTES
Name: Alee Pollock      : 1965      MRN: [id number]  Encounter Provider: Kriste Babinski, CRNP  Encounter Date: 3/23/2023   Encounter department: [place] [address]  Type 2 diabetes mellitus without complication, without long-term current use of insulin (Formerly [facility])  Assessment & Plan:    Lab Results   Component Value Date    HGBA1C 7 0 (A) 2023     Well-controlled on current regimen  CMP, hemoglobin A1c, and microalbumin were ordered to be completed prior to next office visit  Diabetic eye exam was completed in the office today  Orders:  -     POCT hemoglobin A1c  -     IRIS Diabetic eye exam  -     Comprehensive metabolic panel; Future  -     HEMOGLOBIN A1C W/ EAG ESTIMATION; Future  -     Microalbumin / creatinine urine ratio    2  Mixed hyperlipidemia  Assessment & Plan:  Repeat lipid panel was ordered to be completed prior to next office visit  Patient was advised to continue to limit fatty and fried foods in her diet  If cholesterol levels are still elevated at her next office visit I will once again discuss starting a statin with the patient  Orders:  -     Lipid Panel with Direct LDL reflex; Future    3  Allergic rhinitis, unspecified seasonality, unspecified trigger  Assessment & Plan:  Patient was advised to continue Claritin-D in the morning and was also started on Singulair at bedtime to help with her allergy symptoms  Orders:  -     montelukast (SINGULAIR) 10 mg tablet; Take 1 tablet (10 mg total) by mouth daily at bedtime    4  Trigger finger of left thumb  Assessment & Plan:  Orthopedics referral was placed to see if patient qualifies for trigger point injection  Patient was advised to continue to ice the area as needed and to use NSAIDs and Tylenol as needed for pain  Orders:  -     Ambulatory Referral to Orthopedic Surgery; Future    5   Encounter for screening mammogram for malignant neoplasm of breast  -     Mammo screening bilateral w 3d & cad; Future; Expected date: 03/23/2023    6  Screening for cervical cancer  -     Ambulatory Referral to Obstetrics / Gynecology; Future      BMI Counseling: Body mass index is 29 64 kg/m²  The BMI is above normal  Nutrition recommendations include decreasing portion sizes, encouraging healthy choices of fruits and vegetables, moderation in carbohydrate intake and increasing intake of lean protein  Exercise recommendations include exercising 3-5 times per week and obtaining a gym membership  No pharmacotherapy was ordered  Rationale for BMI follow-up plan is due to patient being overweight or obese  Depression Screening and Follow-up Plan: Patient was screened for depression during today's encounter  They screened negative with a PHQ-2 score of 0  Subjective      Allergic rhinitis: Patient reports that over the past few weeks she has been noticing increased nasal congestion as well as sinus congestion  She reports that she is taking Claritin-D currently and this has somewhat improved her symptoms  She reports that she does not use nasal sprays as she does not like the way that they " make her nose feel "     Type II diabetes: Patient's hemoglobin A1c in the office today improved to 7 0  Patient is currently managed on metformin 500 mg twice daily  Patient denies polydipsia, polyphagia, or polyuria  Hyperlipidemia: Patient's most recent lipid panel showed elevated total cholesterol and LDL so she was started on Lipitor 10 mg daily however, patient would like to hold off on starting this medication as she has been attempting to exercise more and eat a healthier diet  Trigger finger of left thumb: Patient reports that over the past few weeks she has noticed that her left thumb has been causing her pain with movement and has also been making a "cracking" noise with movement  She also notes some swelling at the basal carpometacarpal joint    She denies any erythema or warmth to touch in the area         Review of Systems   Constitutional: Negative for chills and fever  HENT: Positive for congestion, postnasal drip, rhinorrhea and sinus pressure  Negative for ear pain, sinus pain and sore throat  Eyes: Negative for pain and visual disturbance  Respiratory: Negative for cough, chest tightness, shortness of breath and wheezing  Cardiovascular: Negative for chest pain, palpitations and leg swelling  Gastrointestinal: Negative for abdominal pain, constipation, diarrhea, nausea and vomiting  Endocrine: Negative for cold intolerance, heat intolerance, polydipsia, polyphagia and polyuria  Genitourinary: Negative for decreased urine volume, dysuria and hematuria  Musculoskeletal: Positive for arthralgias (left thumb )  Negative for back pain and myalgias  Skin: Negative for color change and rash  Allergic/Immunologic: Positive for environmental allergies  Neurological: Negative for dizziness, seizures, syncope, weakness, light-headedness, numbness and headaches  Hematological: Negative for adenopathy  Psychiatric/Behavioral: Negative for confusion  The patient is not nervous/anxious  All other systems reviewed and are negative        Current Outpatient Medications on File Prior to Visit   Medication Sig   • glucose blood test strip Use as instructed   • ketoconazole (NIZORAL) 2 % cream Apply topically daily   • metFORMIN (GLUCOPHAGE) 500 mg tablet Take 1 tablet (500 mg total) by mouth 2 (two) times a day with meals   • nabumetone (RELAFEN) 750 mg tablet TAKE 1 TABLET(750 MG) BY MOUTH TWICE DAILY AS NEEDED FOR MILD PAIN OR MODERATE PAIN   • [DISCONTINUED] atorvastatin (LIPITOR) 10 mg tablet Take 1 tablet (10 mg total) by mouth daily       Objective     /84 (BP Location: Right arm, Patient Position: Sitting, Cuff Size: Large)   Temp 97 5 °F (36 4 °C) (Temporal)   Ht 5' 3 5" (1 613 m)   Wt 77 1 kg (170 lb) Comment: per pt  BMI 29 64 kg/m²     Physical Exam  Vitals and nursing note reviewed  Constitutional:       General: She is not in acute distress  Appearance: Normal appearance  She is not ill-appearing  HENT:      Head: Normocephalic  Eyes:      Conjunctiva/sclera: Conjunctivae normal    Cardiovascular:      Rate and Rhythm: Normal rate and regular rhythm  Pulses: Normal pulses  no weak pulses          Carotid pulses are 2+ on the right side and 2+ on the left side  Radial pulses are 2+ on the right side and 2+ on the left side  Dorsalis pedis pulses are 2+ on the right side and 2+ on the left side  Posterior tibial pulses are 2+ on the right side and 2+ on the left side  Heart sounds: Normal heart sounds  No murmur heard  Pulmonary:      Effort: Pulmonary effort is normal  No respiratory distress  Breath sounds: Normal breath sounds  No decreased breath sounds, wheezing, rhonchi or rales  Abdominal:      General: Abdomen is flat  Bowel sounds are normal  There is no distension  Palpations: Abdomen is soft  Tenderness: There is no abdominal tenderness  There is no guarding  Musculoskeletal:         General: Normal range of motion  Left hand: Swelling and bony tenderness present  Normal range of motion  Normal strength  Normal capillary refill  Cervical back: Normal range of motion  Right lower leg: No edema  Left lower leg: No edema  Comments: Some slight edema and pain noted with palpation of the left thumb at the basal carpometacarpal joint  Crepitus was also noted with movement of this area  Symptoms are consistent with possible trigger finger  Feet:      Right foot:      Skin integrity: No ulcer, skin breakdown, erythema, warmth, callus or dry skin  Left foot:      Skin integrity: No ulcer, skin breakdown, erythema, warmth, callus or dry skin  Skin:     General: Skin is warm and dry  Capillary Refill: Capillary refill takes less than 2 seconds     Neurological:      General: No focal deficit present  Mental Status: She is alert and oriented to person, place, and time  Psychiatric:         Mood and Affect: Mood normal          Behavior: Behavior normal          Thought Content: Thought content normal          Judgment: Judgment normal      Diabetic Foot Exam    Patient's shoes and socks removed  Right Foot/Ankle   Right Foot Inspection  Skin Exam: skin normal  Skin not intact, no dry skin, no warmth, no callus, no erythema, no maceration, no abnormal color, no pre-ulcer, no ulcer and no callus  Toe Exam: ROM and strength within normal limits  No swelling, no tenderness, erythema and  no right toe deformity    Sensory   Vibration: intact  Proprioception: intact  Monofilament testing: intact    Vascular  Capillary refills: < 3 seconds  The right DP pulse is 2+  The right PT pulse is 2+  Left Foot/Ankle  Left Foot Inspection  Skin Exam: skin normal  Skin not intact, no dry skin, no warmth, no erythema, no maceration, normal color, no pre-ulcer, no ulcer and no callus  Toe Exam: ROM and strength within normal limits  No swelling, no erythema and no left toe deformity  Sensory   Vibration: intact  Proprioception: intact  Monofilament testing: intact    Vascular  Capillary refills: < 3 seconds  The left DP pulse is 2+  The left PT pulse is 2+       Assign Risk Category  No deformity present  No loss of protective sensation  No weak pulses  Risk: [address], CRNP

## 2023-03-24 ENCOUNTER — TELEPHONE (OUTPATIENT)
Dept: OBGYN CLINIC | Facility: HOSPITAL | Age: 58
End: 2023-03-24

## 2023-03-24 NOTE — TELEPHONE ENCOUNTER
Patient is being referred to a orthopedics  Please schedule accordingly      [facility] [address]   [phone]

## 2023-04-17 PROBLEM — K64.8 INTERNAL HEMORRHOIDS: Status: ACTIVE | Noted: 2023-04-17

## 2023-04-24 ENCOUNTER — HOSPITAL ENCOUNTER (OUTPATIENT)
Dept: MAMMOGRAPHY | Facility: MEDICAL CENTER | Age: 58
Discharge: HOME | End: 2023-04-24

## 2023-04-24 VITALS — BODY MASS INDEX: 29.02 KG/M2 | HEIGHT: 64 IN | WEIGHT: 170 LBS

## 2023-04-24 DIAGNOSIS — Z12.31 ENCOUNTER FOR SCREENING MAMMOGRAM FOR MALIGNANT NEOPLASM OF BREAST: ICD-10-CM

## 2023-05-26 NOTE — ASSESSMENT & PLAN NOTE
Faxed back to the surgery office  Fax# [phone] Lab Results   Component Value Date    HGBA1C 7 0 (A) 03/23/2023     Well-controlled on current regimen  CMP, hemoglobin A1c, and microalbumin were ordered to be completed prior to next office visit  Diabetic eye exam was completed in the office today

## 2023-05-30 NOTE — ASSESSMENT & PLAN NOTE
Patient does have lipid panel ordered to be completed prior to next office visit  Patient was advised to continue to limit fatty and fried foods in her diet  [employer] Counseling:  I discussed with the patient the risks of mycophenolate mofetil including but not limited to infection/immunosuppression, GI upset, hypokalemia, hypercholesterolemia, bone marrow suppression, lymphoproliferative disorders, malignancy, GI ulceration/bleed/perforation, colitis, interstitial lung disease, kidney failure, progressive multifocal leukoencephalopathy, and birth defects.  The patient understands that monitoring is required including a baseline creatinine and regular CBC testing. In addition, patient must alert us immediately if symptoms of infection or other concerning signs are noted.

## 2023-06-06 ENCOUNTER — OFFICE VISIT (OUTPATIENT)
Dept: OBGYN CLINIC | Facility: CLINIC | Age: 58
End: 2023-06-06
Payer: COMMERCIAL

## 2023-06-06 VITALS
BODY MASS INDEX: 29.02 KG/M2 | HEIGHT: 64 IN | SYSTOLIC BLOOD PRESSURE: 117 MMHG | DIASTOLIC BLOOD PRESSURE: 77 MMHG | WEIGHT: 170 LBS

## 2023-06-06 DIAGNOSIS — M65.312 TRIGGER FINGER OF LEFT THUMB: ICD-10-CM

## 2023-06-06 DIAGNOSIS — E11.9 TYPE 2 DIABETES MELLITUS WITHOUT COMPLICATION, WITHOUT LONG-TERM CURRENT USE OF INSULIN (HCC): ICD-10-CM

## 2023-06-06 DIAGNOSIS — M65.331 TRIGGER FINGER, RIGHT MIDDLE FINGER: Primary | ICD-10-CM

## 2023-06-06 PROCEDURE — 20550 NJX 1 TENDON SHEATH/LIGAMENT: CPT | Performed by: PHYSICIAN ASSISTANT

## 2023-06-06 PROCEDURE — 99213 OFFICE O/P EST LOW 20 MIN: CPT | Performed by: PHYSICIAN ASSISTANT

## 2023-06-06 RX ORDER — BETAMETHASONE SODIUM PHOSPHATE AND BETAMETHASONE ACETATE 3; 3 MG/ML; MG/ML
3 INJECTION, SUSPENSION INTRA-ARTICULAR; INTRALESIONAL; INTRAMUSCULAR; SOFT TISSUE
Status: COMPLETED | OUTPATIENT
Start: 2023-06-06 | End: 2023-06-06

## 2023-06-06 RX ORDER — LIDOCAINE HYDROCHLORIDE 10 MG/ML
2.5 INJECTION, SOLUTION INFILTRATION; PERINEURAL
Status: COMPLETED | OUTPATIENT
Start: 2023-06-06 | End: 2023-06-06

## 2023-06-06 RX ADMIN — LIDOCAINE HYDROCHLORIDE 2.5 ML: 10 INJECTION, SOLUTION INFILTRATION; PERINEURAL at 16:30

## 2023-06-06 RX ADMIN — BETAMETHASONE SODIUM PHOSPHATE AND BETAMETHASONE ACETATE 3 MG: 3; 3 INJECTION, SUSPENSION INTRA-ARTICULAR; INTRALESIONAL; INTRAMUSCULAR; SOFT TISSUE at 16:30

## 2023-06-06 NOTE — PROGRESS NOTES
Assessment:    Right long trigger finger  Left trigger thumb      Plan:    Steroid injections provided today and the patient tolerated well  Activities as tolerated  She may follow-up on an as-needed basis in the future  Surgery can be considered if symptoms do not significantly improve or resolve  Musculoskeletal and Integument    Trigger finger of left thumb                Subjective:     HPI    Patient ID:  Alex Crum is a right hand dominant 62 y o  female presenting for evaluation of the right long finger and the left thumb  According to the patient, she has roughly 8-month history of locking and clicking of these digits without injury or trauma to the area  She states the palmar aspect of these areas are sore especially with gripping and any activities  She has to manually unlock these digits at x2  There is no associated numbness and tingling  Primary care stated she may have trigger digits and she was referred here  She has not had any formal treatment thus far  She has no history of surgery to the right or left hand/digits  The following portions of the patient's history were reviewed and updated as appropriate: allergies, current medications, past family history, past medical history, past social history, past surgical history, and problem list     Review of Systems     Objective:    Imaging:  None       Physical Exam       General appearance:  NAD   Cardiac:  Regular rate  Lungs:  Unlabored breathing  Abdomen:  Non-distended    Orthopedic Examination:  Right long finger   Left thumb     Inspection: No open wounds or erythema  No ecchymosis or swelling  Palpation: Palpable nodule that is tender to palpation A1 pulley right long and left thumb    Range-of-motion: There is clicking and crepitus with each of these digits with range of motion      Strength: 5/5 wrist flexion extension,     Sensation: Intact to light touch median radial ulnar nerve distribution    Special Tests: Good cap refill at the fingertip  Palpable radial pulse    Hand/upper extremity injection: L thumb A1  Universal Protocol:  Consent: Verbal consent obtained  Risks and benefits: risks, benefits and alternatives were discussed  Consent given by: patient  Patient identity confirmed: verbally with patient    Supporting Documentation  Indications: tendon swelling and pain   Procedure Details  Condition:trigger finger Location: thumb - L thumb A1   Preparation: Patient was prepped and draped in the usual sterile fashion  Needle size: 22 G  Medications administered: 2 5 mL lidocaine 1 %; 3 mg betamethasone acetate-betamethasone sodium phosphate 6 (3-3) mg/mL    Patient tolerance: patient tolerated the procedure well with no immediate complications  Dressing:  Sterile dressing applied     Hand/upper extremity injection: R long A1  Universal Protocol:  Consent: Verbal consent obtained    Risks and benefits: risks, benefits and alternatives were discussed  Consent given by: patient  Patient identity confirmed: verbally with patient    Supporting Documentation  Indications: pain and tendon swelling   Procedure Details  Condition:trigger finger Location: long finger - R long A1   Preparation: Patient was prepped and draped in the usual sterile fashion  Needle size: 22 G  Medications administered: 2 5 mL lidocaine 1 %; 3 mg betamethasone acetate-betamethasone sodium phosphate 6 (3-3) mg/mL    Patient tolerance: patient tolerated the procedure well with no immediate complications  Dressing:  Sterile dressing applied

## (undated) DEVICE — ADHESIVE SKIN HIGH VISCOSITY EXOFIN 1ML

## (undated) DEVICE — PACK PBDS SHOULDER ARTHROSCOPY RF

## (undated) DEVICE — DISPOSABLE EQUIPMENT COVER: Brand: SMALL TOWEL DRAPE

## (undated) DEVICE — SHOULDER SUSPENSION KIT 6 PER BOX

## (undated) DEVICE — TUBING SUCTION 5MM X 12 FT

## (undated) DEVICE — FIBERTAK ROTATOR CUFF DISPOSABLES KIT

## (undated) DEVICE — THREADED CLEAR CANNULA WITH OBTURATOR 7MM X 75MM

## (undated) DEVICE — BLADE SHAVER DISSECTOR 4MM 13CM COOLCUT

## (undated) DEVICE — SUT MONOCRYL 4-0 PS-2 18 IN Y496G

## (undated) DEVICE — THREADED CLEAR CANNULA WITH OBTURATOR 8.5MM X 75MM

## (undated) DEVICE — DRESSING MEPILEX AG BORDER 4 X 4 IN

## (undated) DEVICE — GLOVE SRG BIOGEL ECLIPSE 7

## (undated) DEVICE — GLOVE SRG BIOGEL 7.5

## (undated) DEVICE — 3M™ IOBAN™ 2 ANTIMICROBIAL INCISE DRAPE 6650EZ: Brand: IOBAN™ 2

## (undated) DEVICE — SPONGE PVP SCRUB WING STERILE

## (undated) DEVICE — INTENDED FOR TISSUE SEPARATION, AND OTHER PROCEDURES THAT REQUIRE A SHARP SURGICAL BLADE TO PUNCTURE OR CUT.: Brand: BARD-PARKER ® CARBON RIB-BACK BLADES

## (undated) DEVICE — GLOVE INDICATOR PI UNDERGLOVE SZ 7.5 BLUE

## (undated) DEVICE — BLADE SHAVER DISSECTOR 3.5MM 13CM COOLCUT

## (undated) DEVICE — EXPRESSEW III SUTURE NEEDLE FOR USE WITH EXPRESSEW II OR III SUTURE PASSER: Brand: EXPRESSEW

## (undated) DEVICE — VAPR COOLPULSE 90 ELECTRODE 90 DEGREES SUCTION WITH INTEGRATED HANDPIECE: Brand: VAPR COOLPULSE